# Patient Record
Sex: MALE | Race: WHITE | Employment: UNEMPLOYED | URBAN - METROPOLITAN AREA
[De-identification: names, ages, dates, MRNs, and addresses within clinical notes are randomized per-mention and may not be internally consistent; named-entity substitution may affect disease eponyms.]

---

## 2018-09-14 ENCOUNTER — OFFICE VISIT (OUTPATIENT)
Dept: NEUROLOGY | Age: 43
End: 2018-09-14

## 2018-09-14 VITALS
WEIGHT: 163 LBS | HEART RATE: 79 BPM | DIASTOLIC BLOOD PRESSURE: 80 MMHG | BODY MASS INDEX: 23.34 KG/M2 | HEIGHT: 70 IN | RESPIRATION RATE: 16 BRPM | OXYGEN SATURATION: 95 % | SYSTOLIC BLOOD PRESSURE: 122 MMHG

## 2018-09-14 DIAGNOSIS — G50.0 TRIGEMINAL NEURALGIA OF LEFT SIDE OF FACE: Primary | ICD-10-CM

## 2018-09-14 DIAGNOSIS — C71.9 ANAPLASTIC ASTROCYTOMA OF BRAIN (HCC): ICD-10-CM

## 2018-09-14 DIAGNOSIS — N50.812 PAIN IN LEFT TESTICLE: ICD-10-CM

## 2018-09-14 DIAGNOSIS — M79.652 LEFT THIGH PAIN: ICD-10-CM

## 2018-09-14 RX ORDER — ALPRAZOLAM 0.5 MG/1
TABLET ORAL
Refills: 2 | COMMUNITY
Start: 2018-08-26 | End: 2018-10-26 | Stop reason: SDUPTHER

## 2018-09-14 RX ORDER — GABAPENTIN 300 MG/1
CAPSULE ORAL
COMMUNITY
Start: 2018-09-13 | End: 2018-09-14 | Stop reason: SDUPTHER

## 2018-09-14 RX ORDER — PANTOPRAZOLE SODIUM 40 MG/1
TABLET, DELAYED RELEASE ORAL
Refills: 2 | COMMUNITY
Start: 2018-09-06

## 2018-09-14 RX ORDER — GABAPENTIN 300 MG/1
900 CAPSULE ORAL 3 TIMES DAILY
Qty: 270 CAP | Refills: 2 | Status: SHIPPED | OUTPATIENT
Start: 2018-09-14 | End: 2018-10-03 | Stop reason: SDUPTHER

## 2018-09-14 RX ORDER — SUCRALFATE 1 G/1
TABLET ORAL
Refills: 3 | COMMUNITY
Start: 2018-08-29

## 2018-09-14 RX ORDER — TRAMADOL HYDROCHLORIDE 50 MG/1
50 TABLET ORAL
Qty: 60 TAB | Refills: 0 | Status: SHIPPED | OUTPATIENT
Start: 2018-09-14 | End: 2018-10-26 | Stop reason: CLARIF

## 2018-09-14 RX ORDER — HYDROCODONE BITARTRATE AND ACETAMINOPHEN 5; 325 MG/1; MG/1
TABLET ORAL
Refills: 0 | COMMUNITY
Start: 2018-09-05 | End: 2018-11-26

## 2018-09-14 NOTE — PROGRESS NOTES
NEUROLOGY NEW PATIENT CONSULTATION 
 
REFERRED BY: 
Jamal Moore MD 
 
 
CHIEF COMPLAINT: 
Face and head pain HISTORY OF PRESENT ILLNESS HISTORY PROVIDED BY: 
Patient Family Member: wife Lay Walker is a 37 y.o. male who I am asked to see in consultation for head pain and facial pain. 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 He had an MRI brain in August with stable tumor. The tumor was initially 9-11cm. June of 2016 he had six weeks of chemo and radiation. He then did October 2016 to October 2017 of Temador. He is currently not requiring any further treatment. He doesn't want any more chemo. He currently follows [facility] but is trying to establish at [facility]. He reports he has been getting pain medication from [address] [place] [address]. He has headache and what he describes as tumor pain. However he has developed face pain and sore jaw. He has some issues between his eye and ear. He also has pain that radiates from his side of his head and down. The pain is all on his left side. It began to be searing and pulsing pain down his face. The pain was severe. 10/10 pain. He couldn't see or think. He was also having some left sided blurred vision. [facility] and put on steroids due to possible temporal arterits and he is getting weaned off now. He developed severe left testicular pain about 2 weeks ago. He went to the ED. He had an ultrasound that was negative. He had a CT abdomen that was neg for kidney stones. They saw urology and did a prostate exam and it was told normal. They were told it might be nerve pain because it is radiating to the left thigh. They put him on gabapentin. This was a low dose. He was concerned about taking gabapentin in the past but didn't take it due to concern for SI. He was given enough for 600mg throughout the day. He titrated up to his own meds.  When he had opiates plus gabapentin this helped more. He is taking gabapentin 300mg about 5 times a day plus his opiate medication. No other focal numbness or weakness. [facility] Past Medical History:  
Diagnosis Date  Anxiety  Fatigue  Headache  Joint pain  Leg swelling  Memory loss  Musculoskeletal disorder  Neuropathy [address] Social History Social History  Marital status:  Spouse name: N/A  
 Number of children: N/A  
 Years of education: N/A Social History Main Topics  Smoking status: Former Smoker Quit date: 9/14/2013  Smokeless tobacco: Never Used  Alcohol use No  
 Drug use: Not on file  Sexual activity: Not on file Other Topics Concern  Not on file Social History Narrative  No narrative on file Brent Jordan No significant family history ALLERGIES Allergies Allergen Reactions  Fioricet [Butalbital-Acetaminophen-Caff] Unknown (comments)  Prozac [Fluoxetine] Unknown (comments) CURRENT MEDS Current Outpatient Prescriptions:  
  ALPRAZolam (XANAX) 0.5 mg tablet, TAKE 1 TABLET BY MOUTH TWO TIMES DAILY AS NEEDED FOR ANXIETY, Disp: , Rfl: 2 
  HYDROcodone-acetaminophen (NORCO) 5-325 mg per tablet, TAKE 1 TABLET BY MOUTH EVERY 4 HOURS AS NEEDED FOR PAIN, Disp: , Rfl: 0 
  pantoprazole (PROTONIX) 40 mg tablet, TAKE 1 TABLET BY MOUTH TWO TIMES DAILY, Disp: , Rfl: 2 
  sucralfate (CARAFATE) 1 gram tablet, TAKE 1 TABLET BY MOUTH FOUR TIMES DAILY, Disp: , Rfl: 3 
  gabapentin (NEURONTIN) 300 mg capsule, Take 3 Caps by mouth three (3) times daily. , Disp: 270 Cap, Rfl: 2 
  traMADol (ULTRAM) 50 mg tablet, Take 1 Tab by mouth every six (6) hours as needed for Pain. Max Daily Amount: 200 mg., Disp: 60 Tab, Rfl: 0 REVIEW OF SYSTEMS:  
 
Y  N       Y  N  Y  N   Y  N 
[] [x] AIDS          [] [x] Falls  [x] [x] Memory Loss  [] [x]  Shortness of breath [x] [] Anxiety          [x] [] Fatigue [x] [] Muscle Pain        [] [x]  Skipped beats [] [x] Chest Pain   [x] [] Frequent HA [x] [] Ms Weakness     [] [x]  Snoring 
[] [x] Constipation [] [x]Hearing loss [] [x] Nause/Vomiting  [] [x]  Stomach Pain 
[] [x] Cough          [] [x]Hepatitis [x] [] Neuropathy         [] [x]  Swallowing difficulty 
[] [x] Depression  [] [x]Incontinence [] [x] Poor appetite      [] [x]  Vertigo 
[] [x] Diarrhea       [x] [] Joint Pain [] [x] Rash                   [] [x]  Visual disturbances 
[] [x] Fainting        [x] [] Leg Swelling [] [x] Ringing ears       [] [x]  Weight changes []Unable to obtain  ROS due to  []mental status change  []sedated   []intubated PREVIOUS WORKUP IMAGING: MRI brain from [facility] MRI of brain without and with contrast 
Dated 8/3/2018 9:46 PM 
History: Follow up anaplastic glioma; ?leptomeningeal enhancement. WITH PERFUSION Technique: Multiplanar, multisequence MRI of brain was performed prior to and following IV injection of contrast material 14 cc MultiHance, including a 
dynamic MR susceptibility contrast perfusion sequence Comparison: 5/6/2018 FINDINGS: Again noted is a T2 hyperintense, infiltrative lesion centered in the medial aspect of the left temporal lobe. There is no associated enhancement. The perfusion images demonstrate no increased rCBV. This is similar to prior study. Confluent left periventricular FLAIR hyperintensity is also similar to prior study which represent posttreatment changes. There is no evidence of acute infarction or other acute process. No abnormal postcontrast enhancement. The major intracranial vascular flow-voids are 
preserved. Ventricle and basal cisterns are clear. No midline shift, transtentorial or tonsillar herniation. IMPRESSION: 
No evidence of interval tumor progression or other significant interval change. Nonenhancing T2 hyperintense mass lesion in the left temporal lobe without 
increased perfusion. 10/18/17 Neuro oncology visit note: 
Neuro-oncology OP f/u visit note: Basic Information Source of history:  
Referral source: Dr. Fortino Giles 
 
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 History of Present Illness 40 yo white male, right handed, with h/o mononucleosis, presents with sore throat, dysphagia and incidental finding of L temporal lesion. Pt reports that before this ED visit yesterday, he came to [address] [place] [address] ED on 3/9/16 with a L side headache, word finding difficutly, confusion. He was found hypokelemic, was treated as migraine. His headache was resolved later, as well as ? expressive aphasia. But he reports that since he was diagnosed mononucleosis since 10/2015, he has been easily tired, low energetic, memory declining, as well as some vision declining. He denies obvious seizue episodes. He can recall even the aphasia episode well. Merrilladamaris Rendon he had worsening sore throat with dysphagia, so he was brought to [address] [place] [address] ED again. CT neck soft tissue had incidental finding of L temporal large hypodensity lesion. 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 He is on antiviral treatment. Given the earlier sign of L uncal herniation on image study, LP/CSF study is on hold, as well as steroid d/t concern of CNS lymphoma. When I saw him today, he denies headache, hemiparesis/hemiparesthesia, aphasia, seizure spells, fever, neck stiffness. Pt had a surgical biopsy on 6/6/2016. Pathology revealed WHO Grade 3 Astrocytoma. Completed treatment: 
concurrent TMZ/RT: 7/25/16-8/31/16 Current treatment: 
adjuvant TMZ chemotherapy (started on 10/3/2016),  
cycle 1: 10/3-10/7/16 cycle 2: 10/31-11/4/16 cycle 3: 11/28-12/2/16 cycle 4: 12/26/16, 1/3-1/6/17 
cycle 5: 2/6/17-2/10/17 cycle 6: 3/6/17-3/10/17 cycle 7: 4/3/17-4/7/17 cycle 8: 5/1/17-5/5/17 cycle 9: 6/5/17-6/9/17 
cycle 10: 7/30/17-8/3/17 cycle 11: 8/27/17-8/311/17 cycle 12: 9/26/2017-9/30/2017 Interval history: 
 
June 22, 2016: Pt was seen as an IP consult on 6/2/2016. He had a surgical biopsy done on 6/6/2016. Pathology revealed WHO grade 3 astrocytoma as above. He tolerated surgery well, was d/c home on 6/8. He reports some headache, still on decadron 8mg bid. He also reports that his short term memory, expressive aphasia are improved. September 28, 2016: 
Pt is here for f/u post radiation therapy concurrent with TMZ chemotherapy. Pt reports that he feels well for the past five days, but had fatigue, n/v, during the concurrent RT/TMZ. He also had headache, for which he is still on decadron 6 mg daily. Pt reports that ativan helped to control his n/v during the above RT/TMZ treatment. He denies hemiparesis, seizure spell. He was also found gastritis with upper abd pain, is under investigation per GI. He reports intermittent fullness of b/l ear, was seen by PCP with unremarkable finding. Oct 26, 2016: 
Pt reports that he tolerated the 1st cycle of TMZ relatively well, except he still took ativan and compazine alternatively, with zofran for nausea. He feels that he is more energetic now. He does feel that his short term memory is not as good as it used to be, but also admits that he does have stress and anxiety. He denies HA, sz, hemiparesis. He also takes American Ginseng 2000mg daily for fatigue and feels that it is helpful. He is currently on decadron 2 mg daily. December 9, 2016: 
Pt reports that he had bad nausea and vomiting during the past cycle of TMZ chemotherapy, but admits that he has not taken antiemesis for 1-2 days. He also reports a few days ago, he has an acute LLE transient numbness. Otherwise he denies headache, seizure. He has weaned off the steroid. January 13, 2017: 
Pt reports that he had a norovirus infection during holiday, which caused him nausea, vomiting, diarrhea.  He started TMZ cycle 4 on 12/26, but had to suspend it, and resumed it on 1/3 and finished it on 1/6/17. He also c/o malaise, headache, SOB during and about one week after chemotherapy. He was in ED with a neagative CTA chest for PE w/u. He endorses that he is improving, denies any hemiparesis, sz spell. February 8, 2017: 
Pt reports that he just recovered from his cold. He started this cycle of TMZ chemotherapy on 2/6, tolerated well, denies n/v, hemiparesis, seizure. March 8, 2017: 
Pt had a repeat MRI on 3/6, which is an closer monitoring the change that was noticed on last image study. He also had MGMT checked, with a result showing methylated. He c/o continous headache on L side of head, described as sharp, hammer hitting like, constant if not using narcotics, with photophobia. He denies morning wake up pattern, n/v, hemiparesis. His sleep is good, about 7 hr per night. He denies previous hx of chronic headache. April 27, 2017: 
Pt reports that his headache, stress during the chemotherapy is better. He takes hydrocodone and aniemesis. He denies any sz events. June 21, 2017: Pt was last seen by Ms. Claudia Godfrey NP on 5/25/2017. Pt's wife reports that he has been more emotional, abrupt crying frequently. He denies worse headache, n/v, hemiparesis, seizure events or insomnia. But he endorses poor appetite, weight loss. He does have some anxity. Pt refuses to go to see mental health for evaluation. 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 Pt cries a few times during this visit and is anxious to leave. August 17, 2017: He is doing well today and states he has done well over the past month. He tolerated his chemo as well as possible. Pt denies chewing or swallowing difficulties. He denies vomiting, diarrhea, constipation. He has constant nausea. Denies CP or SOB, denies incontinence, denies changes in his extremity strength or function. He denies blackouts, seizures or falls.  He is taking Vicodin for headache and has gotten into taking it everyday. I explained to him he should not take it everyday unless he has a severe headache. He states some days he will take 8 pills a day. I explained to him we have got to cut back on this. I have limited his pills this RX and have spoken with Dr. Eleuterio Jolly about the treatment of HA. October 18, 2017: Pt was last seen by Marvin Lopez NP on 9/14. He completed his 12th cycle of adjuvant TMZ chemotherapy after that visit. He reports no more than usual side effect, e.g. fatigue, mild nausea. But he obvious lost some weight continuously. He also reports that he still takes 4-5 pills of Vicodin almost daily for his headache and ? anxiety. He admits that he did not follow what I gave him the dose a few months ago which was no mor than three pills a day (90 tab a month of script). When he had to cut down to 2 pills a day which was prescribed by NP, he reports more diarrhea. He knows that he developed some dependence to narcotics and would like to slowly cut it down. He denies seizure events, hemiparesis/hemiparesthesia. Histories Past Medical History:  
Problem List (Active Medical Only) This information was current as of 10/18/17 @ 15:09:00. Active: 
-Abdominal pain 
-Astrocytoma of brain 
-Shoulder pain 
-SUPERIOR GLENOID LABRUM LESION Social History This information as of 15:09 on 10/18/17. 
-Tobacco Assessment Former smoker, Cigarettes Former smoker, Cigarettes Use: 10 or more cigarettes (1/2 pack or more)/day in last 30 days. Cigarettes Former smoker 
-Alcohol Assessment Denies Alcohol Use Comment: States he only drinks water for fluid intake. 
-Substance Abuse Assessment Denies Substance Use 
-Nutrition/Health Assessment Type of diet: Pt. avoiding sugary foods. Regular 
-Exercise Assessment Exercise frequency: Daily. Comment: Likes to play outside with the kids 
-Home/Environment Assessment Lives with rents a house with his stay at home wife and 2 young children 1/y/o and 3 y/o. Home type: Single 
family house. Home equipment: None. 
-Functional Assessment Relies on others for physical assistance: None. Relies on others for transportation: None. 
-Employment/School Assessment Disability 
-Other Assessment HITS score = 4 Comment: has a large family, several siblings and mom in the area 20 year pack hx of smoking, now stopped. Family History:  
no cancer hx in 1st degree family members. Allergies / Current Meds / Problem List  
Allergies: . Allergic Reactions (Selected) NKA Current medications: . Home Medications This information was current as OF 10/18/17 @ 15:09:00. Prescriptions Documented Meds By Hx: 
-alprazolam (Xanax 0.5 mg oral tablet)(Rx): 0.5 mg, PO, twice daily, as needed, for anxiety [Still taking, as prescribed] -docusate (docusate sodium 100 mg oral capsule)(Rx): 100 mg, PO, every 12 hours [Still taking, as prescribed] 
-gabapentin (gabapentin 100 mg oral capsule)(Rx): Special Instructions: 1 cap qhs x 3 day then 1 cap bid x 3 days, then 1 cap tid [Still taking, as prescribed] 
-ondansetron (Zofran ODT 8 mg oral tablet, disintegrating)(Rx): 8 mg, PO, every 8 hours [Still taking, as prescribed] 
-ondansetron (ondansetron 8 mg oral tablet, disintegrating)(Rx): 8 mg, PO, every 8 hours [Still taking, as prescribed] 
-pantoprazole (Protonix 40 mg oral delayed release tablet)(Rx): 40 mg, PO, twice daily [Still taking, as prescribed] 
-sucralfate (Carafate 1 g oral tablet)(Rx): 1 g, PO, before meals and at bedtime [Still taking, as prescribed] 
-sulindac (sulindac 150 mg oral tablet)(Rx): 150 mg, PO, twice daily, as needed, Headache [Still taking, as prescribed] 
-temozolomide (Temodar 180 mg oral capsule)(Rx): 360 mg, PO, bedtime, TOTAL 380MG DAILYat the same time every daywith a full glass of waterdo not crush or chew [Still taking, as prescribed] Physical Examination VS: T 36.7 C /88 HR 66 RR 20 Sp02 100% Pain 0 Wt 66.2 kg (10/18 14:49) NEUROLOGICAL EXAMINATION: 
Neurologic Exam: 
MS: alert and oriented to person, place, month, and year, following commands. Speech: fluent, no dysarthria. CN: II-XII intact; pupils equal and reactive. Motor: 5/5 throughout, bilaterally. Sensory: intact to LT, pain, temprature, vibration and proprioception. Reflexes 1+ brisk throughout, 1+ patellar bilaterally, downgoing toes. Coordination: FNF, HTS - wnl, MELANY - wnl. Pronator drift: neg Romburg's: neg 
Gait: nnl  
 
 
KPS: 80 Medical Decision Making: 
chart reviewed MRI brain w/wo contrast on 10/18/2017: reviewed My preview is stable tumor site. Final read is pending. Lab: 
Most Recent Labs (Last 30 Days): This information was current as of 10/18/17 @ 15:09:00. CBC (09/26 12:38) Hgb 15.1 HCT 40.3 WBC 3.5 MCV 90.6  Last 24H:Most Recent Labs (Last 30 Days): This information was current as of 10/18/17 @ 15:09:00. CBC (09/26 12:38) Hgb 15.1 HCT 40.3 WBC 3.5 MCV 90.6  Last 24H: 
 
 
Assessment and Plan:  
44 yo white male, right handed, with L temporal anaplastic astrocytoma, s/p surgical biopsy and completion of concurrent TMZ/RT and 12 cycles of adjuvant TMZ chemotherapy, with c/o chronic headache, anxiety and likely narcotic dependence, stable T2 FLAIR on MRI, with unremarkable neuro exam. 
 
-Reviewed images with pt and his wife, it shows stable disease by my preview, though the final read is pending.  
-Since he has completed 12 cycles of adjuvant TMZ chemotherapy, we will stop this treatment. 
-Pt and wife are aware that he has developed some narcotic dependency, they would like to cut it down slowly. I have reviewed his narcotics prescription through [facility] which shows that he only receives scripts of narcotics from our service.  His urine test was + for opiate in the past. So I prescribed 5 tab daily dose for him this month, and told them that we will cut it down next month. I also prescribed xanax 0.5 mg bid prn for his anxiety. 
-Encourage daily exercises for 30 min, 5 days a week. -Will repeat brain MRI in 8 weeks. RTC in 2 month. cc:  
Dr. Dillon Dubon (rad-onc) Dr. Arabella Norwood (nsgy) PCP:MELISSA MAZA MD 
 
 
================================================================== 
PERFORM Performed By: Clarke Lin [id number] is COMPLETED MODIFY Performed By: Clarke Lin [id number] is COMPLETED 
SIGN Performed By: Clarke Lin [id number] is COMPLETED 
VERIFY Performed By: Clarke Lin [id number] is COMPLETED Author: Chrissie Herbert Neuro-Oncology OP Estab Visit I personally reviewed the patient's records from [facility] oncology and ENT. The above note is his most recent oncology note. In review of [facility] records patient has completed treatment and they were weaning him off on narcotics that were started during treatment for headache. Apparently the wife and patient agreed to this regimen. In the  that I reviewed today his last narcotic prescription from [facility] was May 7. On 9/2 and 9/5 he received narcotics from the emergency room and his urologist. 
 
Yoav Dodson No results found for this or any previous visit. PHYSICAL EXAM 
Visit Vitals  /80  Pulse 79  Resp 16  
 Ht 5' 10\" (1.778 m)  Wt 73.9 kg (163 lb)  SpO2 95%  BMI 23.39 kg/m2 General:  Alert, cooperative, no distress. Head:  Normocephalic, without obvious abnormality, atraumatic. Eyes:  Conjunctivae/corneas clear. Pupils equal, round, reactive to light. Extraocular movements intact, VFF, NO papilledema Lungs: 
Heart:   Non labored breathing Regular rate and rhythm, no carotid bruits Abdomen:   Soft, non-distended Extremities: Extremities normal, atraumatic, no cyanosis or edema. Pulses: 2+ and symmetric all extremities. Skin: Skin color, texture, turgor normal. No rashes or lesions.   
Neurologic:  Gen: Attention normal 
 Language: naming, repetition, fluency normal 
           Memory: intact recent and remote memory Cranial Nerves: 
I: smell Not tested II: visual fields Full to confrontation II: pupils Equal, round, reactive to light II: optic disc No papilledema III,VII: ptosis none III,IV,VI: extraocular muscles  Full ROM V: mastication normal  
V: facial light touch sensation  normal  
VII: facial muscle function   symmetric VIII: hearing symmetric IX: soft palate elevation  normal  
XI: trapezius strength  5/5 XI: sternocleidomastoid strength 5/5 XI: neck flexion strength  5/5 XII: tongue  midline Motor: normal bulk and tone, no tremor Strength: 5/5 all four extremities Sensory: intact to LT, PP, vibration, and temperature Coordination: FTN intact, Rhomberg negative Gait: Antalgic gait due to testicular pain Reflexes: 2+ throughout IMPRESSION Kaelyn Xiao is a 37 y.o. male who presents for evaluation of facial pain, headache, and testicular pain. 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 Patient today presents with facial pain and testicular pain. He reports some radiation of pain down his left thigh as well. Will do an MRI of the lumbar spine and EMG/NCS to evaluate for any nerve issue. I suspect patient's facial pain could be trigeminal neuralgia. Discussed the patient neuropathic pain medication is the best for this. Schedule given to increase his gabapentin. One-time prescription for tramadol was given. Discussed with patient that if further medication is needed he will need to be a pain management. RECOMMENDATIONS Visit Diagnoses Trigeminal neuralgia of left side of face    -  Primary Relevant Medications  
 gabapentin (NEURONTIN) 300 mg capsulepatient should increase to 900 mg 3 times a day   
 traMADol (ULTRAM) 50 mg tablet 1 time prescription given.   Discussed with patient that no further narcotic pain medication will be given and that he will need pain management for further meds Left thigh pain/pain in left testicle Relevant Medications  
 traMADol (ULTRAM) 50 mg tablet Other Relevant Orders MRI LUMB SPINE W WO CONT  
 EMG LIMITED Anaplastic astrocytoma of brain ([facility] [address] Relevant Medications Recent MRI with stable tumor Encourage patient to establish with [facility] brain tumor clinic or [facility] FU after testing Tyrell Zapien MD 
 
CC: Linn Rodriguez MD/ Fax: [phone] Medications and side effects discussed with patient in detail. With any new medications prescribed, patient was given instructions on administration and side effects. Written medication information was provided to the patient as well. This note was created using voice recognition software. Despite editing, there may be syntax errors. This note will not be viewable in [address]

## 2018-09-14 NOTE — LETTER
Dear Edward Matthew MD, Thank you for allowing me to see your patient, Collin Pinzon for a neurological consultation. Please see my impression and recommendations as outlined in my note. Sincerely, Inés Covarrubias MD 
[facility] at [address] [place] [address] BY: 
Edward Matthew MD 
 
 
CHIEF COMPLAINT: 
Face and head pain HISTORY OF PRESENT ILLNESS HISTORY PROVIDED BY: 
Patient Family Member: wife Collin Pinzon is a 37 y.o. male who I am asked to see in consultation for head pain and facial pain. 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 He had an MRI brain in August with stable tumor. The tumor was initially 9-11cm. June of 2016 he had six weeks of chemo and radiation. He then did October 2016 to October 2017 of Temador. He is currently not requiring any further treatment. He doesn't want any more chemo. [facility] but is trying to establish at [facility]. He reports he has been getting pain medication from [facility]. He has headache and what he describes as tumor pain. However he has developed face pain and sore jaw. He has some issues between his eye and ear. He also has pain that radiates from his side of his head and down. The pain is all on his left side. It began to be searing and pulsing pain down his face. The pain was severe. 10/10 pain. He couldn't see or think. He was also having some left sided blurred vision. [facility] and [facility] on steroids due to possible temporal arterits and he is getting weaned off now. He developed severe left testicular pain about 2 weeks ago. He went to the ED. He had an ultrasound that was negative. He had a CT abdomen that was neg for kidney stones.  They saw urology and did a prostate exam and it was told normal. They were told it might be nerve pain because it is radiating to the left thigh. They put him on gabapentin. This was a low dose. He was concerned about taking gabapentin in the past but didn't take it due to concern for SI. He was given enough for 600mg throughout the day. He titrated up to his own meds. When he had opiates plus gabapentin this helped more. He is taking gabapentin 300mg about 5 times a day plus his opiate medication. No other focal numbness or weakness. [facility] Past Medical History:  
Diagnosis Date  Anxiety  Fatigue  Headache  Joint pain  Leg swelling  Memory loss  Musculoskeletal disorder  Neuropathy [address] Social History Social History  Marital status:  Spouse name: N/A  
 Number of children: N/A  
 Years of education: N/A Social History Main Topics  Smoking status: Former Smoker Quit date: 9/14/2013  Smokeless tobacco: Never Used  Alcohol use No  
 Drug use: Not on file  Sexual activity: Not on file Other Topics Concern  Not on file Social History Narrative  No narrative on file [facility] No significant family history ALLERGIES Allergies Allergen Reactions  Fioricet [Butalbital-Acetaminophen-Caff] Unknown (comments)  Prozac [Fluoxetine] Unknown (comments) CURRENT MEDS Current Outpatient Prescriptions:  
  ALPRAZolam (XANAX) 0.5 mg tablet, TAKE 1 TABLET BY MOUTH TWO TIMES DAILY AS NEEDED FOR ANXIETY, Disp: , Rfl: 2 
  HYDROcodone-acetaminophen (NORCO) 5-325 mg per tablet, TAKE 1 TABLET BY MOUTH EVERY 4 HOURS AS NEEDED FOR PAIN, Disp: , Rfl: 0 
  pantoprazole (PROTONIX) 40 mg tablet, TAKE 1 TABLET BY MOUTH TWO TIMES DAILY, Disp: , Rfl: 2 
  sucralfate (CARAFATE) 1 gram tablet, TAKE 1 TABLET BY MOUTH FOUR TIMES DAILY, Disp: , Rfl: 3 
  gabapentin (NEURONTIN) 300 mg capsule, Take 3 Caps by mouth three (3) times daily. , Disp: 270 Cap, Rfl: 2 
  traMADol (ULTRAM) 50 mg tablet, Take 1 Tab by mouth every six (6) hours as needed for Pain. Max Daily Amount: 200 mg., Disp: 60 Tab, Rfl: 0 REVIEW OF SYSTEMS:  
 
Y  N       Y  N  Y  N   Y  N 
[] [x] AIDS          [] [x] Falls  [x] [x] Memory Loss  [] [x]  Shortness of breath [x] [] Anxiety          [x] [] Fatigue [x] [] Muscle Pain        [] [x]  Skipped beats 
[] [x] Chest Pain   [x] [] Frequent HA [x] [] Ms Weakness     [] [x]  Snoring 
[] [x] Constipation [] [x]Hearing loss [] [x] Nause/Vomiting  [] [x]  Stomach Pain 
[] [x] Cough          [] [x]Hepatitis [x] [] Neuropathy         [] [x]  Swallowing difficulty 
[] [x] Depression  [] [x]Incontinence [] [x] Poor appetite      [] [x]  Vertigo 
[] [x] Diarrhea       [x] [] Joint Pain [] [x] Rash                   [] [x]  Visual disturbances 
[] [x] Fainting        [x] [] Leg Swelling [] [x] Ringing ears       [] [x]  Weight changes []Unable to obtain  ROS due to  []mental status change  []sedated   []intubated PREVIOUS WORKUP IMAGING: MRI brain from [facility] MRI of brain without and with contrast 
Dated 8/3/2018 9:46 PM 
History: Follow up anaplastic glioma; ?leptomeningeal enhancement. WITH PERFUSION Technique: Multiplanar, multisequence MRI of brain was performed prior to and following IV injection of contrast material 14 cc MultiHance, including a 
dynamic MR susceptibility contrast perfusion sequence Comparison: 5/6/2018 FINDINGS: Again noted is a T2 hyperintense, infiltrative lesion centered in the medial aspect of the left temporal lobe. There is no associated enhancement. The perfusion images demonstrate no increased rCBV. This is similar to prior study. Confluent left periventricular FLAIR hyperintensity is also similar to prior study which represent posttreatment changes. There is no evidence of acute infarction or other acute process. No abnormal postcontrast enhancement. The major intracranial vascular flow-voids are 
preserved. Ventricle and basal cisterns are clear.  No midline shift, transtentorial or tonsillar herniation. IMPRESSION: 
No evidence of interval tumor progression or other significant interval change. Nonenhancing T2 hyperintense mass lesion in the left temporal lobe without 
increased perfusion. 10/18/17 Neuro oncology visit note: 
Neuro-oncology OP f/u visit note: 
 
Basic Information Source of history:  
Referral source: Dr. Camila Ramirez 
 
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 History of Present Illness 40 yo white male, right handed, with h/o mononucleosis, presents with sore throat, dysphagia and incidental finding of L temporal lesion. Pt reports that before this ED visit yesterday, he came to [address] [place] [address] ED on 3/9/16 with a L side headache, word finding difficutly, confusion. He was found hypokelemic, was treated as migraine. His headache was resolved later, as well as ? expressive aphasia. But he reports that since he was diagnosed mononucleosis since 10/2015, he has been easily tired, low energetic, memory declining, as well as some vision declining. He denies obvious seizue episodes. He can recall even the aphasia episode well. Jackson Danelle he had worsening sore throat with dysphagia, so he was brought to [address] [place] [address] ED again. CT neck soft tissue had incidental finding of L temporal large hypodensity lesion. 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 He is on antiviral treatment. Given the earlier sign of L uncal herniation on image study, LP/CSF study is on hold, as well as steroid d/t concern of CNS lymphoma. When I saw him today, he denies headache, hemiparesis/hemiparesthesia, aphasia, seizure spells, fever, neck stiffness. Pt had a surgical biopsy on 6/6/2016. Pathology revealed WHO Grade 3 Astrocytoma. Completed treatment: 
concurrent TMZ/RT: 7/25/16-8/31/16 Current treatment: 
adjuvant TMZ chemotherapy (started on 10/3/2016),  
 cycle 1: 10/3-10/7/16 cycle 2: 10/31-11/4/16 cycle 3: 11/28-12/2/16 cycle 4: 12/26/16, 1/3-1/6/17 
cycle 5: 2/6/17-2/10/17 cycle 6: 3/6/17-3/10/17 cycle 7: 4/3/17-4/7/17 cycle 8: 5/1/17-5/5/17 cycle 9: 6/5/17-6/9/17 
cycle 10: 7/30/17-8/3/17 cycle 11: 8/27/17-8/311/17 cycle 12: 9/26/2017-9/30/2017 Interval history: 
 
June 22, 2016: Pt was seen as an IP consult on 6/2/2016. He had a surgical biopsy done on 6/6/2016. Pathology revealed WHO grade 3 astrocytoma as above. He tolerated surgery well, was d/c home on 6/8. He reports some headache, still on decadron 8mg bid. He also reports that his short term memory, expressive aphasia are improved. September 28, 2016: 
Pt is here for f/u post radiation therapy concurrent with TMZ chemotherapy. Pt reports that he feels well for the past five days, but had fatigue, n/v, during the concurrent RT/TMZ. He also had headache, for which he is still on decadron 6 mg daily. Pt reports that ativan helped to control his n/v during the above RT/TMZ treatment. He denies hemiparesis, seizure spell. He was also found gastritis with upper abd pain, is under investigation per GI. He reports intermittent fullness of b/l ear, was seen by PCP with unremarkable finding. Oct 26, 2016: 
Pt reports that he tolerated the 1st cycle of TMZ relatively well, except he still took ativan and compazine alternatively, with zofran for nausea. He feels that he is more energetic now. He does feel that his short term memory is not as good as it used to be, but also admits that he does have stress and anxiety. He denies HA, sz, hemiparesis. He also takes American Ginseng 2000mg daily for fatigue and feels that it is helpful. He is currently on decadron 2 mg daily.  
 
December 9, 2016: 
Pt reports that he had bad nausea and vomiting during the past cycle of TMZ chemotherapy, but admits that he has not taken antiemesis for 1-2 days. He also reports a few days ago, he has an acute LLE transient numbness. Otherwise he denies headache, seizure. He has weaned off the steroid. January 13, 2017: 
Pt reports that he had a norovirus infection during holiday, which caused him nausea, vomiting, diarrhea. He started TMZ cycle 4 on 12/26, but had to suspend it, and resumed it on 1/3 and finished it on 1/6/17. He also c/o malaise, headache, SOB during and about one week after chemotherapy. He was in ED with a neagative CTA chest for PE w/u. He endorses that he is improving, denies any hemiparesis, sz spell. February 8, 2017: 
Pt reports that he just recovered from his cold. He started this cycle of TMZ chemotherapy on 2/6, tolerated well, denies n/v, hemiparesis, seizure. March 8, 2017: 
Pt had a repeat MRI on 3/6, which is an closer monitoring the change that was noticed on last image study. He also had MGMT checked, with a result showing methylated. He c/o continous headache on L side of head, described as sharp, hammer hitting like, constant if not using narcotics, with photophobia. He denies morning wake up pattern, n/v, hemiparesis. His sleep is good, about 7 hr per night. He denies previous hx of chronic headache. April 27, 2017: 
Pt reports that his headache, stress during the chemotherapy is better. He takes hydrocodone and aniemesis. He denies any sz events. June 21, 2017: Pt was last seen by Ms. Roberto Casas NP on 5/25/2017. Pt's wife reports that he has been more emotional, abrupt crying frequently. He denies worse headache, n/v, hemiparesis, seizure events or insomnia. But he endorses poor appetite, weight loss. He does have some anxity. Pt refuses to go to see mental health for evaluation. 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 Pt cries a few times during this visit and is anxious to leave.  
 
 
August 17, 2017: He is doing well today and states he has done well over the past month. He tolerated his chemo as well as possible. Pt denies chewing or swallowing difficulties. He denies vomiting, diarrhea, constipation. He has constant nausea. Denies CP or SOB, denies incontinence, denies changes in his extremity strength or function. He denies blackouts, seizures or falls. He is taking Vicodin for headache and has gotten into taking it everyday. I explained to him he should not take it everyday unless he has a severe headache. He states some days he will take 8 pills a day. I explained to him we have got to cut back on this. I have limited his pills this RX and have spoken with Dr. Juleen Schilder about the treatment of HA. October 18, 2017: Pt was last seen by Koby Freeman NP on 9/14. He completed his 12th cycle of adjuvant TMZ chemotherapy after that visit. He reports no more than usual side effect, e.g. fatigue, mild nausea. But he obvious lost some weight continuously. He also reports that he still takes 4-5 pills of Vicodin almost daily for his headache and ? anxiety. He admits that he did not follow what I gave him the dose a few months ago which was no mor than three pills a day (90 tab a month of script). When he had to cut down to 2 pills a day which was prescribed by NP, he reports more diarrhea. He knows that he developed some dependence to narcotics and would like to slowly cut it down. He denies seizure events, hemiparesis/hemiparesthesia. Histories Past Medical History:  
Problem List (Active Medical Only) This information was current as of 10/18/17 @ 15:09:00. Active: 
-Abdominal pain 
-Astrocytoma of brain 
-Shoulder pain 
-SUPERIOR GLENOID LABRUM LESION Social History This information as of 15:09 on 10/18/17. 
-Tobacco Assessment Former smoker, Cigarettes Former smoker, Cigarettes Use: 10 or more cigarettes (1/2 pack or more)/day in last 30 days. Cigarettes Former smoker 
-Alcohol Assessment Denies Alcohol Use 
 Comment: States he only drinks water for fluid intake. 
-Substance Abuse Assessment Denies Substance Use 
-Nutrition/Health Assessment Type of diet: Pt. avoiding sugary foods. Regular 
-Exercise Assessment Exercise frequency: Daily. Comment: Likes to play outside with the kids 
-Home/Environment Assessment Lives with rents a house with his stay at home wife and 2 young children 1/y/o and 1 y/o. Home type: Single 
family house. Home equipment: None. 
-Functional Assessment Relies on others for physical assistance: None. Relies on others for transportation: None. 
-Employment/School Assessment Disability 
-Other Assessment HITS score = 4 Comment: has a large family, several siblings and mom in the area 20 year pack hx of smoking, now stopped. Family History:  
no cancer hx in 1st degree family members. Allergies / Current Meds / Problem List  
Allergies: . Allergic Reactions (Selected) NKA Current medications: . Home Medications This information was current as OF 10/18/17 @ 15:09:00. Prescriptions Documented Meds By Hx: 
-alprazolam (Xanax 0.5 mg oral tablet)(Rx): 0.5 mg, PO, twice daily, as needed, for anxiety [Still taking, as prescribed] -docusate (docusate sodium 100 mg oral capsule)(Rx): 100 mg, PO, every 12 hours [Still taking, as prescribed] 
-gabapentin (gabapentin 100 mg oral capsule)(Rx): Special Instructions: 1 cap qhs x 3 day then 1 cap bid x 3 days, then 1 cap tid [Still taking, as prescribed] 
-ondansetron (Zofran ODT 8 mg oral tablet, disintegrating)(Rx): 8 mg, PO, every 8 hours [Still taking, as prescribed] 
-ondansetron (ondansetron 8 mg oral tablet, disintegrating)(Rx): 8 mg, PO, every 8 hours [Still taking, as prescribed] 
-pantoprazole (Protonix 40 mg oral delayed release tablet)(Rx): 40 mg, PO, twice daily [Still taking, as prescribed] 
-sucralfate (Carafate 1 g oral tablet)(Rx): 1 g, PO, before meals and at bedtime [Still taking, as prescribed] -sulindac (sulindac 150 mg oral tablet)(Rx): 150 mg, PO, twice daily, as needed, Headache [Still taking, as prescribed] 
-temozolomide (Temodar 180 mg oral capsule)(Rx): 360 mg, PO, bedtime, TOTAL 380MG DAILYat the same time every daywith a full glass of waterdo not crush or chew [Still taking, as prescribed] Physical Examination VS: T 36.7 C /88 HR 66 RR 20 Sp02 100% Pain 0 Wt 66.2 kg (10/18 14:49) NEUROLOGICAL EXAMINATION: 
Neurologic Exam: 
MS: alert and oriented to person, place, month, and year, following commands. Speech: fluent, no dysarthria. CN: II-XII intact; pupils equal and reactive. Motor: 5/5 throughout, bilaterally. Sensory: intact to LT, pain, temprature, vibration and proprioception. Reflexes 1+ brisk throughout, 1+ patellar bilaterally, downgoing toes. Coordination: FNF, HTS - wnl, MELANY - wnl. Pronator drift: neg Romburg's: neg 
Gait: nnl  
 
 
KPS: 80 Medical Decision Making: 
chart reviewed MRI brain w/wo contrast on 10/18/2017: reviewed My preview is stable tumor site. Final read is pending. Lab: 
Most Recent Labs (Last 30 Days): This information was current as of 10/18/17 @ 15:09:00. CBC (09/26 12:38) Hgb 15.1 HCT 40.3 WBC 3.5 MCV 90.6  Last 24H:Most Recent Labs (Last 30 Days): This information was current as of 10/18/17 @ 15:09:00. CBC (09/26 12:38) Hgb 15.1 HCT 40.3 WBC 3.5 MCV 90.6  Last 24H: 
 
 
Assessment and Plan:  
44 yo white male, right handed, with L temporal anaplastic astrocytoma, s/p surgical biopsy and completion of concurrent TMZ/RT and 12 cycles of adjuvant TMZ chemotherapy, with c/o chronic headache, anxiety and likely narcotic dependence, stable T2 FLAIR on MRI, with unremarkable neuro exam. 
 
-Reviewed images with pt and his wife, it shows stable disease by my preview, though the final read is pending.  
-Since he has completed 12 cycles of adjuvant TMZ chemotherapy, we will stop this treatment. -Pt and wife are aware that he has developed some narcotic dependency, they would like to cut it down slowly. I have reviewed his narcotics prescription through [facility] which shows that he only receives scripts of narcotics from our service. His urine test was + for opiate in the past. So I prescribed 5 tab daily dose for him this month, and told them that we will cut it down next month. I also prescribed xanax 0.5 mg bid prn for his anxiety. 
-Encourage daily exercises for 30 min, 5 days a week. -Will repeat brain MRI in 8 weeks. RTC in 2 month. cc:  
Dr. Yessi Sesay (rad-onc) Dr. Jennifer Paul (nsgy) PCP:MELISSA MAZA MD 
 
 
================================================================== 
PERFORM Performed By: Samantha Yung [id number] is COMPLETED MODIFY Performed By: Samantha Yung [id number] is COMPLETED 
SIGN Performed By: Samantha Yung [id number] is COMPLETED 
VERIFY Performed By: Samantha Yung [id number] is COMPLETED Author: [facility] OP Estab Visit I personally reviewed the patient's records from [facility] oncology and ENT. The above note is his most recent oncology note. In review of [facility] records patient has completed treatment and they were weaning him off on narcotics that were started during treatment for headache. Apparently the wife and patient agreed to this regimen. In the  that I reviewed today his last narcotic prescription from [facility] was May 7. On 9/2 and 9/5 he received narcotics from the emergency room and his urologist. 
 
Joe Lubin No results found for this or any previous visit. PHYSICAL EXAM 
Visit Vitals  /80  Pulse 79  Resp 16  
 Ht 5' 10\" (1.778 m)  Wt 73.9 kg (163 lb)  SpO2 95%  BMI 23.39 kg/m2 General:  Alert, cooperative, no distress. Head:  Normocephalic, without obvious abnormality, atraumatic. Eyes:  Conjunctivae/corneas clear. Pupils equal, round, reactive to light. Extraocular movements intact, VFF, NO papilledema Lungs: 
Heart:   Non labored breathing Regular rate and rhythm, no carotid bruits Abdomen:   Soft, non-distended Extremities: Extremities normal, atraumatic, no cyanosis or edema. Pulses: 2+ and symmetric all extremities. Skin: Skin color, texture, turgor normal. No rashes or lesions. Neurologic:  Gen: Attention normal 
           Language: naming, repetition, fluency normal 
           Memory: intact recent and remote memory Cranial Nerves: 
I: smell Not tested II: visual fields Full to confrontation II: pupils Equal, round, reactive to light II: optic disc No papilledema III,VII: ptosis none III,IV,VI: extraocular muscles  Full ROM V: mastication normal  
V: facial light touch sensation  normal  
VII: facial muscle function   symmetric VIII: hearing symmetric IX: soft palate elevation  normal  
XI: trapezius strength  5/5 XI: sternocleidomastoid strength 5/5 XI: neck flexion strength  5/5 XII: tongue  midline Motor: normal bulk and tone, no tremor Strength: 5/5 all four extremities Sensory: intact to LT, PP, vibration, and temperature Coordination: FTN intact, Rhomberg negative Gait: Antalgic gait due to testicular pain Reflexes: 2+ throughout IMPRESSION Carmen Quintero is a 37 y.o. male who presents for evaluation of facial pain, headache, and testicular pain. 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 Patient today presents with facial pain and testicular pain. He reports some radiation of pain down his left thigh as well. Will do an MRI of the lumbar spine and EMG/NCS to evaluate for any nerve issue. I suspect patient's facial pain could be trigeminal neuralgia. Discussed the patient neuropathic pain medication is the best for this. Schedule given to increase his gabapentin. One-time prescription for tramadol was given.   Discussed with patient that if further medication is needed he will need to be a pain management. RECOMMENDATIONS Visit Diagnoses Trigeminal neuralgia of left side of face    -  Primary Relevant Medications  
 gabapentin (NEURONTIN) 300 mg capsulepatient should increase to 900 mg 3 times a day   
 traMADol (ULTRAM) 50 mg tablet 1 time prescription given. Discussed with patient that no further narcotic pain medication will be given and that he will need pain management for further meds Left thigh pain/pain in left testicle Relevant Medications  
 traMADol (ULTRAM) 50 mg tablet Other Relevant Orders MRI LUMB SPINE W WO CONT  
 EMG LIMITED Anaplastic astrocytoma of brain ([address] Relevant Medications Recent MRI with stable tumor Encourage patient to establish with [facility] brain tumor clinic or [facility] FU after testing Akosua Boyd MD 
 
CC: Thiago Snell MD/ Fax: [phone] Medications and side effects discussed with patient in detail. With any new medications prescribed, patient was given instructions on administration and side effects. Written medication information was provided to the patient as well. This note was created using voice recognition software. Despite editing, there may be syntax errors. This note will not be viewable in [address] Chief Complaint Patient presents with  
 Head Pain  Other  
  brain tumor/ Trigeminal  neuralgia 1. Have you been to the ER, urgent care clinic since your last visit? Hospitalized since your last visit? No 
 
2. Have you seen or consulted any other health care providers outside of the [address] [place] [address] since your last visit? Include any pap smears or colon screening. No  
 
 
Visit Vitals  /80  Pulse 79  Resp 16  
 Ht 5' 10\" (1.778 m)  Wt 73.9 kg (163 lb)  SpO2 95%  BMI 23.39 kg/m2

## 2018-09-14 NOTE — PROGRESS NOTES
Chief Complaint Patient presents with  
 Head Pain  Other  
  brain tumor/ Trigeminal  neuralgia 1. Have you been to the ER, urgent care clinic since your last visit? Hospitalized since your last visit? No 
 
2. Have you seen or consulted any other health care providers outside of the [address] [place] [address] since your last visit? Include any pap smears or colon screening. No  
 
 
Visit Vitals  /80  Pulse 79  Resp 16  
 Ht 5' 10\" (1.778 m)  Wt 73.9 kg (163 lb)  SpO2 95%  BMI 23.39 kg/m2

## 2018-09-14 NOTE — MR AVS SNAPSHOT
[facility] [address] [facility] [address] [phone] Patient: Collin Pinzon MRN: [id number] Visit Information Date & Time Provider Department Dept. Phone Encounter #  
 2018  1:00 PM Alma Kirby MD [facility] Neurology [facility] [phone] [id number] Upcoming Health Maintenance Date Due DTaP/Tdap/Td series (1 - Tdap) 1996 Influenza Age 5 to Adult 2018 Allergies as of 2018  Never Reviewed Severity Noted Reaction Type Reactions Fioricet [Butalbital-acetaminophen-caff]  2018    Unknown (comments) Prozac [Fluoxetine]  2018    Unknown (comments) Current Immunizations  Never Reviewed No immunizations on file. Not reviewed this visit You Were Diagnosed With   
  
 Codes Comments Trigeminal neuralgia of left side of face    -  Primary ICD-10-CM: G50.0 ICD-9-CM: 350.1 Left thigh pain     ICD-10-CM: S62.660 ICD-9-CM: 729.5 Pain in left testicle     ICD-10-CM: P37.672 ICD-9-CM: 608.9 Anaplastic astrocytoma of brain ([facility] [address]     ICD-10-CM: C71.9 ICD-9-CM: 191.9 Vitals BP Pulse Resp Height(growth percentile) Weight(growth percentile) SpO2  
 122/80 79 16 5' 10\" (1.778 m) 163 lb (73.9 kg) 95% BMI Smoking Status 23.39 kg/m2 Former Smoker Vitals History BMI and BSA Data Body Mass Index Body Surface Area  
 23.39 kg/m 2 1.91 m 2 Preferred Pharmacy [facility] Name Phone [id number] [address], [address] [facility] [phone] Your Updated Medication List  
  
   
This list is accurate as of 18  2:02 PM.  Always use your most recent med list.  
  
  
  
  
 ALPRAZolam 0.5 mg tablet Commonly known as:  XANAX  
TAKE 1 TABLET BY MOUTH TWO TIMES DAILY AS NEEDED FOR ANXIETY  
  
 gabapentin 300 mg capsule Commonly known as:  NEURONTIN Take 3 Caps by mouth three (3) times daily. HYDROcodone-acetaminophen 5-325 mg per tablet Commonly known as:  NORCO  
TAKE 1 TABLET BY MOUTH EVERY 4 HOURS AS NEEDED FOR PAIN  
  
 pantoprazole 40 mg tablet Commonly known as:  PROTONIX  
TAKE 1 TABLET BY MOUTH TWO TIMES DAILY  
  
 sucralfate 1 gram tablet Commonly known as:  CARAFATE  
TAKE 1 TABLET BY MOUTH FOUR TIMES DAILY  
  
 traMADol 50 mg tablet Commonly known as:  ULTRAM  
Take 1 Tab by mouth every six (6) hours as needed for Pain. Max Daily Amount: 200 mg. Prescriptions Printed Refills  
 traMADol (ULTRAM) 50 mg tablet 0 Sig: Take 1 Tab by mouth every six (6) hours as needed for Pain. Max Daily Amount: 200 mg. Class: Print Route: Oral  
  
Prescriptions Sent to Pharmacy Refills  
 gabapentin (NEURONTIN) 300 mg capsule 2 Sig: Take 3 Caps by mouth three (3) times daily. Class: Normal  
 Pharmacy: [facility] [address], [address] [place] [address] Ph #: [phone] Route: Oral  
  
To-Do List   
 09/15/2018 Neurology:  EMG LIMITED   
  
 09/15/2018 Imaging:  MRI LUMB SPINE W WO CONT Patient Instructions PRESCRIPTION REFILL POLICY [facility] Statement to Patients April 1, 2014 In an effort to ensure the large volume of patient prescription refills is processed in the most efficient and expeditious manner, we are asking our patients to assist us by calling your Pharmacy for all prescription refills, this will include also your  Mail Order Pharmacy. The pharmacy will contact our office electronically to continue the refill process. Please do not wait until the last minute to call your pharmacy. We need at least 48 hours (2days) to fill prescriptions. We also encourage you to call your pharmacy before going to  your prescription to make sure it is ready. With regard to controlled substance prescription refill requests (narcotic refills) that need to be picked up at our office, we ask your cooperation by providing us with at least 72 hours (3days) notice that you will need a refill. We will not refill narcotic prescription refill requests after 4:00pm on any weekday, Monday through Thursday, or after 2:00pm on Fridays, or on the weekends. We encourage everyone to explore another way of getting your prescription refill request processed using [employer], our patient web portal through our electronic medical record system. [employer] is an efficient and effective way to communicate your medication request directly to the office and  downloadable as an kojo on your smart phone . [employer] also features a review functionality that allows you to view your medication list as well as leave messages for your physician. Are you ready to get connected? If so please review the attatched instructions or speak to any of our staff to get you set up right away! Thank you so much for your cooperation. Should you have any questions please contact our Practice Administrator. The Physicians and Staff,  [facility] Patient Instruction Plan/ Result Policy If we have ordered testing for you, know that; \"NO NEWS IS GOOD NEWS! \" It is our policy that we know longer call patients with results, nor do we  give test results over the phone. We schedule follow up appointments so that your results can be discussed in person. This allows you to address any questions you have regarding the results. If something of concern is revealed on your test, we will contact you to discuss the matter and if needed schedule a sooner follow up appointment.  
 Additionally, results may be found by using the My Chart feature and one of our patient service representatives at the  can give you instructions on how to access this feature to utilize our electronic medical record system. Thank you for your understanding. Introducing [facility]! [employer] introduces [employer] patient portal. Now you can access parts of your medical record, email your doctor's office, and request medication refills online. 1. In your internet browser, go to [email or web address] 2. Click on the First Time User? Click Here link in the Sign In box. You will see the New Member Sign Up page. 3. Enter your [employer] Access Code exactly as it appears below. You will not need to use this code after youve completed the sign-up process. If you do not sign up before the expiration date, you must request a new code. · [employer] Access Code: [email or web address] Expires: 12/13/2018  2:02 PM 
 
4. Enter the last four digits of your Social Security Number (xxxx) and Date of Birth (mm/dd/yyyy) as indicated and click Submit. You will be taken to the next sign-up page. 5. Create a [employer] ID. This will be your [employer] login ID and cannot be changed, so think of one that is secure and easy to remember. 6. Create a [employer] password. You can change your password at any time. 7. Enter your Password Reset Question and Answer. This can be used at a later time if you forget your password. 8. Enter your e-mail address. You will receive e-mail notification when new information is available in [address] 9. Click Sign Up. You can now view and download portions of your medical record. 10. Click the Download Summary menu link to download a portable copy of your medical information. If you have questions, please visit the Frequently Asked Questions section of the [employer] website. Remember, [employer] is NOT to be used for urgent needs. For medical emergencies, dial 911. Now available from your iPhone and Android! Please provide this summary of care documentation to your next provider. Your primary care clinician is listed as [address]  If you have any questions after today's visit, please call [phone].

## 2018-09-14 NOTE — PATIENT INSTRUCTIONS
PRESCRIPTION REFILL POLICY [facility] Statement to Patients April 1, 2014 In an effort to ensure the large volume of patient prescription refills is processed in the most efficient and expeditious manner, we are asking our patients to assist us by calling your Pharmacy for all prescription refills, this will include also your  Mail Order Pharmacy. The pharmacy will contact our office electronically to continue the refill process. Please do not wait until the last minute to call your pharmacy. We need at least 48 hours (2days) to fill prescriptions. We also encourage you to call your pharmacy before going to  your prescription to make sure it is ready. With regard to controlled substance prescription refill requests (narcotic refills) that need to be picked up at our office, we ask your cooperation by providing us with at least 72 hours (3days) notice that you will need a refill. We will not refill narcotic prescription refill requests after 4:00pm on any weekday, Monday through Thursday, or after 2:00pm on Fridays, or on the weekends. We encourage everyone to explore another way of getting your prescription refill request processed using [employer], our patient web portal through our electronic medical record system. [employer] is an efficient and effective way to communicate your medication request directly to the office and  downloadable as an kojo on your smart phone . [employer] also features a review functionality that allows you to view your medication list as well as leave messages for your physician. Are you ready to get connected? If so please review the attatched instructions or speak to any of our staff to get you set up right away! Thank you so much for your cooperation. Should you have any questions please contact our Practice Administrator. The Physicians and Staff,  [facility] Patient Instruction Plan/ Result Policy If we have ordered testing for you, know that; \"NO NEWS IS GOOD NEWS! \" It is our policy that we know longer call patients with results, nor do we  give test results over the phone. We schedule follow up appointments so that your results can be discussed in person. This allows you to address any questions you have regarding the results. If something of concern is revealed on your test, we will contact you to discuss the matter and if needed schedule a sooner follow up appointment. Additionally, results may be found by using the My Chart feature and one of our patient service representatives at the  can give you instructions on how to access this feature to utilize our electronic medical record system. Thank you for your understanding.

## 2018-09-20 ENCOUNTER — HOSPITAL ENCOUNTER (OUTPATIENT)
Dept: MRI IMAGING | Age: 43
Discharge: HOME | End: 2018-09-20
Attending: PSYCHIATRY & NEUROLOGY
Payer: MEDICAID

## 2018-09-20 DIAGNOSIS — N50.812 PAIN IN LEFT TESTICLE: ICD-10-CM

## 2018-09-20 DIAGNOSIS — M79.652 LEFT THIGH PAIN: ICD-10-CM

## 2018-09-20 PROCEDURE — 72158 MRI LUMBAR SPINE W/O & W/DYE: CPT

## 2018-09-20 PROCEDURE — A9575 INJ GADOTERATE MEGLUMI 0.1ML: HCPCS | Performed by: PSYCHIATRY & NEUROLOGY

## 2018-09-20 PROCEDURE — 74011250636 HC RX REV CODE- 250/636: Performed by: PSYCHIATRY & NEUROLOGY

## 2018-09-20 RX ORDER — GADOTERATE MEGLUMINE 376.9 MG/ML
15 INJECTION INTRAVENOUS
Status: COMPLETED | OUTPATIENT
Start: 2018-09-20 | End: 2018-09-20

## 2018-09-20 RX ADMIN — GADOTERATE MEGLUMINE 15 ML: 376.9 INJECTION INTRAVENOUS at 17:45

## 2018-09-27 NOTE — TELEPHONE ENCOUNTER
----- Message from Georgina Lion sent at 9/27/2018  3:07 PM EDT -----  Regarding: Dr. Timo Boothe  The patient's wife Gregory Blanca is requesting a call back in regards to the patient running out of Rx Tramadol in a couple of days and if it will be refilled since the patient was told to wait for the Rx Gabapentin to work.  Mrs. Laura Zhong also stated that they received a letter from the insurance company that a prior authorization would have to be submitted for Rx Tramadol. ([id number])[phone]

## 2018-10-02 NOTE — TELEPHONE ENCOUNTER
Patient's spouse calling in regards to med refills for patient. Patient has called twice for gabapentin and tramadol refill. Spouse would like a call back as soon as possible today regarding status of medication refill. Spouse also sent message through [employer] as well. Stated that patient has been trying since last week to get meds refilled. Please call back today.  [phone]

## 2018-10-03 ENCOUNTER — TELEPHONE (OUTPATIENT)
Dept: NEUROLOGY | Age: 43
End: 2018-10-03

## 2018-10-03 RX ORDER — GABAPENTIN 300 MG/1
CAPSULE ORAL
Qty: 360 CAP | Refills: 2 | Status: SHIPPED | OUTPATIENT
Start: 2018-10-03 | End: 2018-11-26

## 2018-10-03 NOTE — TELEPHONE ENCOUNTER
PATIENT WOULD LIKE 4 TABLETS tid. PER PATIENT WIFE YOU STATED THAT THEY COULD INCREASE TO THIS AMOUNT.     PLEASE REVIEW AND SIGN

## 2018-10-26 ENCOUNTER — OFFICE VISIT (OUTPATIENT)
Dept: NEUROLOGY | Age: 43
End: 2018-10-26

## 2018-10-26 ENCOUNTER — TELEPHONE (OUTPATIENT)
Dept: NEUROLOGY | Age: 43
End: 2018-10-26

## 2018-10-26 VITALS
HEART RATE: 95 BPM | HEIGHT: 70 IN | RESPIRATION RATE: 20 BRPM | BODY MASS INDEX: 25.05 KG/M2 | DIASTOLIC BLOOD PRESSURE: 82 MMHG | OXYGEN SATURATION: 96 % | SYSTOLIC BLOOD PRESSURE: 124 MMHG | WEIGHT: 175 LBS

## 2018-10-26 DIAGNOSIS — F41.9 ANXIETY: Primary | ICD-10-CM

## 2018-10-26 RX ORDER — CARBAMAZEPINE 200 MG/1
200 TABLET, EXTENDED RELEASE ORAL 2 TIMES DAILY
Qty: 60 TAB | Refills: 5 | Status: SHIPPED | OUTPATIENT
Start: 2018-10-26 | End: 2019-02-12

## 2018-10-26 RX ORDER — BACLOFEN 10 MG/1
10 TABLET ORAL
Qty: 90 TAB | Refills: 5 | Status: SHIPPED | OUTPATIENT
Start: 2018-10-26

## 2018-10-26 RX ORDER — ALPRAZOLAM 0.5 MG/1
TABLET ORAL
Qty: 60 TAB | Refills: 0 | Status: SHIPPED | OUTPATIENT
Start: 2018-10-26

## 2018-10-26 NOTE — LETTER
Neurology Progress Note Patient ID: Abby Amador [id number] 
37 y.o. 
1975 HISTORY PROVIDED BY: 
Patient Family Member: wife Chief Complaint: trigeminal neuralgia and inoperable astrocytoma Subjective:  
 Mr. Gui Rashid is here for follow up today of facial pain. He was on gabapentin and we did a one time dose of tramadol. He is off the tramadol and says he felt like the gabapentin was working at 70% but then in the last 2 weeks it has stopped helping. He is having tingling on that side. He is on gabapentin 1200mg TID. He has had to stay in bed due to pain. His memory has gotten much worse on the gabapentin. I did review his previous records from [address] [place] [address] which do identified that he was on pain medication and they had recommended weaning him off of this. Discussed with patient that we will not be feeling additional tramadol for him. He is aware. He does take Xanax for anxiety. He needs to establish with oncology at [facility]. He has an appointment in 1 week. Discussed with him we will give him medication for 1 month. Cold seems to be a trigger for him. He also continues with testicular pain but it does not seem to be as prominent as it was at his first visit. He is now established with [facility] for his astrocytoma and has an MRI scheduled next week. Objective:  
ROS: 
Per HPI- 
Otherwise 12 point ROS was negative Meds: 
Current Outpatient Medications on File Prior to Visit Medication Sig Dispense Refill  gabapentin (NEURONTIN) 300 mg capsule Take 4 capsules by mouth 3 times daily 360 Cap 2  
 ALPRAZolam (XANAX) 0.5 mg tablet TAKE 1 TABLET BY MOUTH TWO TIMES DAILY AS NEEDED FOR ANXIETY  2  
 HYDROcodone-acetaminophen (NORCO) 5-325 mg per tablet TAKE 1 TABLET BY MOUTH EVERY 4 HOURS AS NEEDED FOR PAIN  0  
 pantoprazole (PROTONIX) 40 mg tablet TAKE 1 TABLET BY MOUTH TWO TIMES DAILY  2  
 sucralfate (CARAFATE) 1 gram tablet TAKE 1 TABLET BY MOUTH FOUR TIMES DAILY  3  
  traMADol (ULTRAM) 50 mg tablet Take 1 Tab by mouth every six (6) hours as needed for Pain. Max Daily Amount: 200 mg. 60 Tab 0 No current facility-administered medications on file prior to visit. Imaging: MRI lumbar spine: Negative Reviewed records in [facility] today Lab Review No results found for this or any previous visit. Exam: 
Visit Vitals /82 Pulse 95 Resp 20 Ht 5' 10\" (1.778 m) Wt 79.4 kg (175 lb) SpO2 96% BMI 25.11 kg/m² Gen: Well developed CV: RRR Lungs: non labored breathing Abd: non distending Neuro: A&O x 3, no dysarthria or aphasia CN II-XII: PERRL, EOMI, face symmetric, tongue/palate midline Motor: strength 5/5 all four ext Sensory: intact to LT Gait: Antalgic gait Assessment:  
Ashutosh Guerrero is a 37 y.o. male who presents for follow up of facial pain, headache, and testicular pain. 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 Patient today presents with facial pain and testicular pain. He reports some radiation of pain down his left thigh as well. MRI of the lumbar spine was negative. Patient did not keep his EMG appointment due to having too much pain that day. We will change him from gabapentin to Tegretol and get him back then to take as needed to help with the trigeminal neuralgia. Is an MRI of the brain scheduled in October with [facility]. He is established now with [facility] neurosurgery. He is also established with oncology at [facility]. They will continue to follow his Xanax prescription once he establishes with them next week. Plan:  
 
Visit Diagnoses Trigeminal neuralgia of left side of face    -  Primary Relevant Medications  
 gabapentin (NEURONTIN) 300 mg capsule will wean down on this medication No further tramadol refills Start Tegretol- mg twice a day schedule given for this Will need to follow-up blood work at next visit Document 10 mg as needed 3 times a day Left thigh pain/pain in left testicle Relevant Medications Gabapentin Other Relevant Orders MRI LUMB SPINE W WO CONT negative EMG LIMITED at follow-up if pain is persistent Anaplastic astrocytoma of brain ([address] Relevant Medications Recent MRI with stable tumorhas another one scheduled with [facility] at the end of October Encourage patient to establish with [facility] brain tumor clinic or [facility] FU in 4 weeks Signed: 
Gabby Lui MD 
10/26/2018 Medications and side effects discussed with patient in detail. With any new medications prescribed, patient was given instructions on administration and side effects. Written medication information was provided to the patient as well. This note was created using voice recognition software. Despite editing, there may be syntax errors. This note will not be viewable in [address] No chief complaint on file. 1. Have you been to the ER, urgent care clinic since your last visit? Hospitalized since your last visit? No 
 
2. Have you seen or consulted any other health care providers outside of the [address] [place] [address] since your last visit? Include any pap smears or colon screening. No  
Visit Vitals /82 Pulse 95 Resp 20 Ht 5' 10\" (1.778 m) Wt 79.4 kg (175 lb) SpO2 96% BMI 25.11 kg/m²

## 2018-10-26 NOTE — TELEPHONE ENCOUNTER
----- Message from Victor M Calderon sent at 10/26/2018  1:38 PM EDT -----  Regarding: Dr. Debbi Hutson  The patient's wife Jenni Baig is requesting that the doctor gives prior authorization for Rx Carbamazepine to the insurance company. Mrs. Alma Boudreaux stated that if the doctor writes urgent that the insurance company will process it faster.  (c)[phone]

## 2018-10-26 NOTE — PROGRESS NOTES
No chief complaint on file. 1. Have you been to the ER, urgent care clinic since your last visit? Hospitalized since your last visit? No 
 
2. Have you seen or consulted any other health care providers outside of the [address] [place] [address] since your last visit? Include any pap smears or colon screening. No  
Visit Vitals /82 Pulse 95 Resp 20 Ht 5' 10\" (1.778 m) Wt 79.4 kg (175 lb) SpO2 96% BMI 25.11 kg/m²

## 2018-10-26 NOTE — PATIENT INSTRUCTIONS
Patient Instructions/Plans: 
Week 1: 
Take gabapentin 2 tabs three times a day Take Tegretol 1/2 tab twice a day Week 2: 
Take gabapentin 1 tab three times a day Take Tegretol 1 tab twice a day Week 3: 
Stop gabapentin and only take Tegretol 1 tab twice a day Take baclofen up to three times a day as needed Carbamazepine (By mouth) Carbamazepine ([place]) Treats seizures, nerve pain, or bipolar disorder. Brand Name(s): Carbatrol, Epitol, Equetro, TEGretol, TEGretol-XR There may be other brand names for this medicine. When This Medicine Should Not Be Used: This medicine is not right for everyone. Do not use it if you had an allergic reaction to carbamazepine or a tricyclic antidepressant or if you are pregnant. How to Use This Medicine:  
Long Acting Capsule, Liquid, Tablet, Chewable Tablet, Long Acting Tablet · Take your medicine as directed. Your dose may need to be changed several times to find what works best for you. This medicine may be used alone or together with other seizure medicines. · Extended-release capsule: ¨ Swallow the capsule whole. Do not break, crush, or chew it. ¨ You may open the capsule and pour the medicine into a small amount of soft food such as pudding, yogurt, or applesauce. Stir this mixture well and swallow it without chewing. · Chewable tablet:  
¨ It is best to take this medicine with food or milk. ¨ Chew the tablet well before swallowing it. · Liquid:  
¨ It is best to take this medicine with food or milk. ¨ Measure the oral liquid medicine with a marked measuring spoon, oral syringe, or medicine cup. Shake the bottle well just before each use. ¨ Do not take this medicine at the same time as other oral liquid medicines. · Tablet:  
¨ It is best to take this medicine with food or milk. ¨ Swallow the tablet whole. Do not crush, break, or chew it. ¨ Do not use an extended-release tablet that is cracked or chipped. · This medicine should come with a Medication Guide. Ask your pharmacist for a copy if you do not have one. · Missed dose: Take a dose as soon as you remember. If it is almost time for your next dose, wait until then and take a regular dose. Do not take extra medicine to make up for a missed dose. · Store the medicine in a closed container at room temperature, away from heat, moisture, and direct light. Drugs and Foods to Avoid: Ask your doctor or pharmacist before using any other medicine, including over-the-counter medicines, vitamins, and herbal products. · Do not use this medicine together with nefazodone, delavirdine, or certain other medicines for HIV or AIDS (including efavirenz, etravirine). Do not use this medicine and an MAO inhibitor (MAOI) within 14 days of each other. · The list below includes some of the most commonly used medicines that can interact with carbamazepine. There are many other drugs not listed. Make sure your doctor knows the names of all the medicines you use. Tell your doctor if you are using any of the following: ¨ Alprazolam, aprepitant, aripiprazole, buspirone, chloroquine, citalopram, clarithromycin, clomipramine, clonazepam, clozapine, cyclophosphamide, cyclosporine, diltiazem, doxorubicin, erythromycin, everolimus, felodipine, fluoxetine, imatinib, isoniazid, lamotrigine, lapatinib, lithium, loxapine, mefloquine, methadone, phenytoin, praziquantel, primidone, propoxyphene, quetiapine, quinine, sirolimus, tacrolimus, temsirolimus, tramadol, trazodone, triazolam, valproate, verapamil, or zileuton ¨ HIV protease inhibitor (including atazanavir, darunavir, fosamprenavir, indinavir, lopinavir, ritonavir, saquinavir) ¨ Medicine to treat fungal infection (including fluconazole, itraconazole, ketoconazole, voriconazole) ¨ Steroid (including dexamethasone, prednisolone, prednisone) · Birth control pills, shots, and other hormonal birth control methods may not work as well while you use this medicine. You may want to use a second form of birth control. · Do not eat grapefruit or drink grapefruit juice while you are using this medicine. · Tell your doctor if you use anything else that makes you sleepy. Some examples are allergy medicine, narcotic pain medicine, and alcohol. Warnings While Using This Medicine: · It is not safe to take this medicine during pregnancy. It could harm an unborn baby. Tell your doctor right away if you become pregnant. · Tell your doctor if you are breastfeeding, or if you have kidney disease, liver disease, glaucoma, heart disease, heart rhythm problems, porphyria, an intolerance to fructose, or a history of bone marrow depression, suicidal thoughts, or depression. Tell your doctor if you had an allergic reaction to any other medicine (especially seizure medicines). · Tell your doctor if you have  ancestry. Your doctor may test you for serious skin reactions before prescribing this medicine. · This medicine may cause the following problems: 
¨ Serious skin reactions ¨ Aplastic anemia or other blood problems ¨ Drug reaction with eosinophilia and systemic symptoms (DRESS), which may damage organs such as the liver, kidney, or heart ¨ Suicidal thoughts or behavior ¨ Low sodium levels in the blood ¨ Liver damage · Do not stop using this medicine suddenly. Your doctor will need to slowly decrease your dose before you stop it completely. · Tell any doctor or dentist who treats you that you are using this medicine. This medicine may affect certain medical test results. · This medicine may make you dizzy or drowsy. Do not drive or do anything else that could be dangerous until you know how this medicine affects you. · Your doctor will do lab tests at regular visits to check on the effects of this medicine. Keep all appointments. Your doctor may want to have your eyes checked by an eye doctor. · Keep all medicine out of the reach of children. Never share your medicine with anyone. Possible Side Effects While Using This Medicine:  
Call your doctor right away if you notice any of these side effects: · Allergic reaction: Itching or hives, swelling in your face or hands, swelling or tingling in your mouth or throat, chest tightness, trouble breathing · Blistering, peeling, red skin rash · Blurred vision, changes in vision · Change in how much or how often you urinate · Chest pain, trouble breathing, cold sweats, bluish skin · Confusion, memory problems, unusual tiredness, muscle spasms or weakness · Dark urine or pale stools, nausea, vomiting, loss of appetite, stomach pain, yellow skin or eyes · Fast, slow, pounding, or uneven heartbeat · Fever, chills, cough, sore throat, or sores in your mouth · Lightheadedness or fainting · Swollen, painful, or tender lymph glands in your neck, armpit, or groin · Thoughts of hurting yourself or others, unusual thoughts or behavior · Unusual bleeding, bruising, or weakness If you notice these less serious side effects, talk with your doctor: · Anxiety, agitation, depression, restlessness, or trouble sleeping · Dizziness or drowsiness · Dry mouth · Mild nausea, vomiting, constipation If you notice other side effects that you think are caused by this medicine, tell your doctor. Call your doctor for medical advice about side effects. You may report side effects to FDA at 2-432-FDA-2778 © 2017 [address]  Information is for End User's use only and may not be sold, redistributed or otherwise used for commercial purposes. The above information is an  only. It is not intended as medical advice for individual conditions or treatments. Talk to your doctor, nurse or pharmacist before following any medical regimen to see if it is safe and effective for you. Baclofen (By injection) Baclofen (RADHA-abilio-fen) Treats muscle spasms. This medicine is a muscle relaxer. Brand Name(s): Gablofen, Lioresal Intrathecal, Lioresal Intrathecal Refill Kit, Lioresal Intrathecal Screening Kit There may be other brand names for this medicine. When This Medicine Should Not Be Used: This medicine is not right for everyone. Do not use it if you had an allergic reaction to baclofen. How to Use This Medicine:  
Injectable · Your doctor will prescribe your exact dose. Your dose may need to be changed several times in order to find out what works best for you. This medicine is given through a needle directly into your spinal cord. · You will first be given 1 or 2 test doses, to see if this medicine will work for you. A nurse or other trained health professional will give you this test dose in a clinic or hospital. You will need to stay for 4 to 8 hours to make sure the medicine does not cause any problems. · If your muscle spasms get better with the test dose, you may be able to start using this medicine all the time. You will need to have a pump placed under your skin, which will pump medicine directly into your back all day. · This medicine should come with a Medication Guide. Ask your pharmacist for a copy if you do not have one. · Your family or other caregivers need to know how to use your pump and medicine. Make sure you and your caregivers all know these 3 things: (1) Signs that you are getting too much or too little medicine. (2) What to do if you have any of these signs. (3) How to care for the pump and the needle that is placed in your back. · Missed dose: You must use this medicine on a fixed schedule. Call your doctor or pharmacist if you miss a dose. Drugs and Foods to Avoid: Ask your doctor or pharmacist before using any other medicine, including over-the-counter medicines, vitamins, and herbal products. · Some medicines can affect how baclofen works.  Tell your doctor if you use anything else that makes you sleepy. Some examples are allergy medicine, narcotic pain medicine, and alcohol. Warnings While Using This Medicine: · Tell your doctor if you are pregnant or breastfeeding, or if you have kidney disease or any kind of infection. Tell your doctor if you used baclofen before, and if you had problems when you stopped using it. · Ask your doctor for an Emergency Card listing the symptoms that may happen if you get too much or too little medicine. Carry the card with you at all times. Learn what the pump alarm sounds like and what to do if the alarm goes off. · Do not stop taking this medicine suddenly without asking your doctor. You may need to decrease your dose slowly before you stop taking it completely. · Your doctor will need to check your progress at regular visits while you are using this medicine. Be sure to keep all appointments. · This medicine may make you dizzy or drowsy. Do not drive or do anything that could be dangerous until you know how this medicine affects you. · Keep all medicine out of the reach of children. Never share your medicine with anyone. Possible Side Effects While Using This Medicine:  
Call your doctor right away if you notice any of these side effects: · Allergic reaction: Itching or hives, swelling in your face or hands, swelling or tingling in your mouth or throat, chest tightness, trouble breathing · Decrease in how much or how often you urinate · Fainting, shallow breathing, seizures · High fever, increased muscle spasms or stiffness, confusion, lightheadedness, numbness, tingling · Muscle weakness · Pain, redness, or swelling around the pump or needle · Unusual sleepiness, tiredness, or lightheadedness If you notice these less serious side effects, talk with your doctor: · Agitation, mild headache · Nausea, vomiting, constipation If you notice other side effects that you think are caused by this medicine, tell your doctor. Call your doctor for medical advice about side effects. You may report side effects to FDA at 9-295-GAG-4264 © 2017 [facility] Information is for End User's use only and may not be sold, redistributed or otherwise used for commercial purposes. The above information is an  only. It is not intended as medical advice for individual conditions or treatments. Talk to your doctor, nurse or pharmacist before following any medical regimen to see if it is safe and effective for you.

## 2018-10-26 NOTE — PROGRESS NOTES
Neurology Progress Note Patient ID: Ashutosh Guerrero [id number] 
37 y.o. 
1975 HISTORY PROVIDED BY: 
Patient Family Member: wife Chief Complaint: trigeminal neuralgia and inoperable astrocytoma Subjective:  
 Mr. Denisse Boone is here for follow up today of facial pain. He was on gabapentin and we did a one time dose of tramadol. He is off the tramadol and says he felt like the gabapentin was working at 70% but then in the last 2 weeks it has stopped helping. He is having tingling on that side. He is on gabapentin 1200mg TID. He has had to stay in bed due to pain. His memory has gotten much worse on the gabapentin. I did review his previous records from [facility] which do identified that he was on pain medication and they had recommended weaning him off of this. Discussed with patient that we will not be feeling additional tramadol for him. He is aware. He does take Xanax for anxiety. He needs to establish with oncology at [facility]. He has an appointment in 1 week. Discussed with him we will give him medication for 1 month. Cold seems to be a trigger for him. He also continues with testicular pain but it does not seem to be as prominent as it was at his first visit. He is now established with [facility] for his astrocytoma and has an MRI scheduled next week. Objective:  
ROS: 
Per HPI- 
Otherwise 12 point ROS was negative Meds: 
Current Outpatient Medications on File Prior to Visit Medication Sig Dispense Refill  gabapentin (NEURONTIN) 300 mg capsule Take 4 capsules by mouth 3 times daily 360 Cap 2  
 ALPRAZolam (XANAX) 0.5 mg tablet TAKE 1 TABLET BY MOUTH TWO TIMES DAILY AS NEEDED FOR ANXIETY  2  
 HYDROcodone-acetaminophen (NORCO) 5-325 mg per tablet TAKE 1 TABLET BY MOUTH EVERY 4 HOURS AS NEEDED FOR PAIN  0  
 pantoprazole (PROTONIX) 40 mg tablet TAKE 1 TABLET BY MOUTH TWO TIMES DAILY  2  
 sucralfate (CARAFATE) 1 gram tablet TAKE 1 TABLET BY MOUTH FOUR TIMES DAILY  3  
  traMADol (ULTRAM) 50 mg tablet Take 1 Tab by mouth every six (6) hours as needed for Pain. Max Daily Amount: 200 mg. 60 Tab 0 No current facility-administered medications on file prior to visit. Imaging: MRI lumbar spine: Negative Reviewed records in [facility] and [facility] tab today Lab Review No results found for this or any previous visit. Exam: 
Visit Vitals /82 Pulse 95 Resp 20 Ht 5' 10\" (1.778 m) Wt 79.4 kg (175 lb) SpO2 96% BMI 25.11 kg/m² Gen: Well developed CV: RRR Lungs: non labored breathing Abd: non distending Neuro: A&O x 3, no dysarthria or aphasia CN II-XII: PERRL, EOMI, face symmetric, tongue/palate midline Motor: strength 5/5 all four ext Sensory: intact to LT Gait: Antalgic gait Assessment:  
Duyen Haas is a 37 y.o. male who presents for follow up of facial pain, headache, and testicular pain. 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 Patient today presents with facial pain and testicular pain. He reports some radiation of pain down his left thigh as well. MRI of the lumbar spine was negative. Patient did not keep his EMG appointment due to having too much pain that day. We will change him from gabapentin to Tegretol and get him back then to take as needed to help with the trigeminal neuralgia. Is an MRI of the brain scheduled in October with [facility]. He is established now with [facility] neurosurgery. He is also established with oncology at [facility]. They will continue to follow his Xanax prescription once he establishes with them next week. Plan:  
 
Visit Diagnoses Trigeminal neuralgia of left side of face    -  Primary Relevant Medications  
 gabapentin (NEURONTIN) 300 mg capsule will wean down on this medication No further tramadol refills Start Tegretol- mg twice a day schedule given for this Will need to follow-up blood work at next visit Document 10 mg as needed 3 times a day Left thigh pain/pain in left testicle Relevant Medications Gabapentin Other Relevant Orders MRI LUMB SPINE W WO CONT negative EMG LIMITED at follow-up if pain is persistent Anaplastic astrocytoma of brain ([address] Relevant Medications Recent MRI with stable tumorhas another one scheduled with [facility] at the end of October Encourage patient to establish with [facility] brain tumor clinic or [facility] FU in 4 weeks Signed: 
Juan Alberto Thapa MD 
10/26/2018 Medications and side effects discussed with patient in detail. With any new medications prescribed, patient was given instructions on administration and side effects. Written medication information was provided to the patient as well. This note was created using voice recognition software. Despite editing, there may be syntax errors. This note will not be viewable in [address]

## 2018-11-26 ENCOUNTER — OFFICE VISIT (OUTPATIENT)
Dept: NEUROLOGY | Age: 43
End: 2018-11-26

## 2018-11-26 VITALS
DIASTOLIC BLOOD PRESSURE: 84 MMHG | WEIGHT: 165 LBS | SYSTOLIC BLOOD PRESSURE: 120 MMHG | BODY MASS INDEX: 23.62 KG/M2 | HEIGHT: 70 IN

## 2018-11-26 DIAGNOSIS — C71.9 ANAPLASTIC ASTROCYTOMA (HCC): Primary | ICD-10-CM

## 2018-11-26 RX ORDER — BACLOFEN 20 MG/1
20 TABLET ORAL 3 TIMES DAILY
Qty: 90 TAB | Refills: 5 | Status: SHIPPED | OUTPATIENT
Start: 2018-11-26 | End: 2019-05-05 | Stop reason: SDUPTHER

## 2018-11-26 RX ORDER — PREDNISONE 20 MG/1
1 TABLET ORAL DAILY
Refills: 2 | COMMUNITY
Start: 2018-09-27

## 2018-11-26 NOTE — PROGRESS NOTES
Patient is here for follow up. Gabapentin did not help neuralgia. Switched to tegretol which helps eliminate pin and needle feeling on his face. Still has bruised, painful feeling on left side of face. Cannot sleep on left side of face. Accompanied by wife, beni.

## 2018-11-26 NOTE — PATIENT INSTRUCTIONS
[address] [facility]   Statement to Patients  April 1, 2014      In an effort to ensure the large volume of patient prescription refills is processed in the most efficient and expeditious manner, we are asking our patients to assist us by calling your Pharmacy for all prescription refills, this will include also your  Mail Order Pharmacy. The pharmacy will contact our office electronically to continue the refill process. Please do not wait until the last minute to call your pharmacy. We need at least 48 hours (2days) to fill prescriptions. We also encourage you to call your pharmacy before going to  your prescription to make sure it is ready. With regard to controlled substance prescription refill requests (narcotic refills) that need to be picked up at our office, we ask your cooperation by providing us with at least 72 hours (3days) notice that you will need a refill. We will not refill narcotic prescription refill requests after 4:00pm on any weekday, Monday through Thursday, or after 2:00pm on Fridays, or on the weekends. We encourage everyone to explore another way of getting your prescription refill request processed using X[employer], our patient web portal through our electronic medical record system. XING is an efficient and effective way to communicate your medication request directly to the office and  downloadable as an kojo on your smart phone . XING also features a review functionality that allows you to view your medication list as well as leave messages for your physician. Are you ready to get connected? If so please review the attatched instructions or speak to any of our staff to get you set up right away! Thank you so much for your cooperation. Should you have any questions please contact our Practice Administrator.     The Physicians and Staff,  [facility]

## 2018-11-27 NOTE — PROGRESS NOTES
Surinder Masterson is a 37 y.o. male who presents with the following  Chief Complaint   Patient presents with    Follow-up       HPI Patient comes in with wife and kids for follow up today of facial pain. He was on gabapentin and this made him feel really weird so was switched to 200 mg Tegretol XR BID and this has helped rid of the pins and needles feeling on the left side of his head but he is still having pain along the v1 route of the trigeminal nerve. He has symptoms in v2-v3 but the dagger, sharp, stabbing pain that comes and goes in the temple area is his biggest concern right now. It will bring him to his knees as it is very painful. Can come for seconds to longer. Sometimes he has had to stay in bed due to pain. He is now at [facility] for his brain tumor and things seem to be stable. Can consider sending to Dr. Juan David Colon if accept insurance to look at trigeminal neuralgia. He also continues with testicular pain but it does not seem to be as prominent as it was at his first visit. He is now established with [facility] for his astrocytoma and has recent MRI normal. Wants to get in with pain management as his pain is big concern. Also on Baclofen 10 mg TID     Allergies   Allergen Reactions    Fioricet [Butalbital-Acetaminophen-Caff] Unknown (comments)    Prozac [Fluoxetine] Unknown (comments)       Current Outpatient Medications   Medication Sig    predniSONE (DELTASONE) 20 mg tablet Take 1 Tab by mouth daily.  baclofen (LIORESAL) 20 mg tablet Take 1 Tab by mouth three (3) times daily.  baclofen (LIORESAL) 10 mg tablet Take 1 Tab by mouth three (3) times daily as needed.  carBAMazepine XR (TEGRETOL XR) 200 mg SR tablet Take 1 Tab by mouth two (2) times a day.     ALPRAZolam (XANAX) 0.5 mg tablet TAKE 1 TABLET BY MOUTH TWO TIMES DAILY AS NEEDED FOR ANXIETY    pantoprazole (PROTONIX) 40 mg tablet TAKE 1 TABLET BY MOUTH TWO TIMES DAILY    sucralfate (CARAFATE) 1 gram tablet TAKE 1 TABLET BY MOUTH FOUR TIMES DAILY     No current facility-administered medications for this visit. Social History     Tobacco Use   Smoking Status Former Smoker    Last attempt to quit: 2013    Years since quittin.2   Smokeless Tobacco Never Used       Past Medical History:   Diagnosis Date    Anxiety     Fatigue     Headache     Joint pain     Leg swelling     Memory loss     Musculoskeletal disorder     Neuropathy        History reviewed. No pertinent surgical history. History reviewed. No pertinent family history. Social History     Socioeconomic History    Marital status:      Spouse name: Not on file    Number of children: Not on file    Years of education: Not on file    Highest education level: Not on file   Tobacco Use    Smoking status: Former Smoker     Last attempt to quit: 2013     Years since quittin.2    Smokeless tobacco: Never Used   Substance and Sexual Activity    Alcohol use: No       Review of Systems   Eyes: Negative for blurred vision, double vision and photophobia. Respiratory: Negative for shortness of breath and wheezing. Cardiovascular: Negative for chest pain and palpitations. Gastrointestinal: Negative for nausea and vomiting. Neurological: Positive for sensory change and headaches. Negative for dizziness, tingling, tremors, seizures and loss of consciousness. Psychiatric/Behavioral: Negative for memory loss. The patient is nervous/anxious. Remainder of comprehensive review is negative. Physical Exam :    Visit Vitals  /84   Ht 5' 10\" (1.778 m)   Wt 74.8 kg (165 lb)   BMI 23.68 kg/m²       General: Well defined, nourished, and groomed individual in no acute distress.    Neck: Supple, nontender, no bruits, no pain with resistance to active range of motion.    Heart: Regular rate and rhythm, no murmurs, rub, or gallop. Normal S1S2.   Lungs: Clear to auscultation bilaterally with equal chest expansion, no cough, no wheeze  Musculoskeletal: Extremities revealed no edema and had full range of motion of joints.    Psych: Good mood and bright affect    NEUROLOGICAL EXAMINATION:    Mental Status: Alert and oriented to person, place, and time    Cranial Nerves:    II, III, IV, VI: Visual acuity grossly intact. Visual fields are normal.    Pupils are equal, round, and reactive to light and accommodation.    Extra-ocular movements are full and fluid. Fundoscopic exam was benign, no ptosis or nystagmus.    V-XII: Hearing is grossly intact. Facial features are symmetric, with normal sensation and strength. The palate rises symmetrically and the tongue protrudes midline. Sternocleidomastoids 5/5. Motor Examination: Normal tone, bulk, and strength, 5/5 muscle strength throughout. Coordination: Finger to nose was normal. No resting or intention tremor    Gait and Station: Steady while walking. Normal arm swing. No pronator drift. No muscle wasting or fasiculations noted. Reflexes: DTRs 2+ throughout. No results found for this or any previous visit. Orders Placed This Encounter    REFERRAL TO PAIN MANAGEMENT     Referral Priority:   Routine     Referral Type:   Consultation     Referral Reason:   Specialty Services Required     Referred to Provider:   Ida Salcedo MD     Number of Visits Requested:   1    predniSONE (DELTASONE) 20 mg tablet     Sig: Take 1 Tab by mouth daily. Refill:  2    baclofen (LIORESAL) 20 mg tablet     Sig: Take 1 Tab by mouth three (3) times daily. Dispense:  90 Tab     Refill:  5       1. Anaplastic astrocytoma ([address]    2. Trigeminal neuralgia     Follow-up Disposition: Not on File      Tegretol  mg BID has really helped his symptoms but still having significant daily head pain. We discussed treatments. We will increase Baclofen to 20 mg TID to help. Can consider Elavil to help with anxiety, pain if needed.  Refer to pain management as we discussed do not do pain management he is aware. Continue to track his symptoms and can think about referral to Dr. Molly Tucker if neuralgia adds up as he feels like this is inhibiting him day to day. Wife agrees. Call with changes.        This note will not be viewable in [facility]

## 2019-01-18 ENCOUNTER — TELEPHONE (OUTPATIENT)
Dept: NEUROLOGY | Age: 44
End: 2019-01-18

## 2019-01-18 NOTE — TELEPHONE ENCOUNTER
Called and spoke with wife per Dr. Ford Led note. We discussed taking the Tegretol three times daily and since he took one in the morning, I suggested that he take one now and then before bedtime. She stated that she would call and let us know next week how he does.

## 2019-01-18 NOTE — TELEPHONE ENCOUNTER
----- Message from [facility] sent at 1/18/2019 12:57 PM EST -----  Regarding: Dr. Abel Walker: [phone]  Pt's wife is checking on the status of a call back because pt has a flare up and wanted to speak with the nurse and was given a call back as of yet.

## 2019-01-18 NOTE — TELEPHONE ENCOUNTER
Patient has been doing well with his Trigeminal neuralgia, Carbamazepine  mg BID and the Baclofen 10mg TID. But for the past 3 days it has come back and the pain is increasing. Please advise    Both Dr. Talya Elizondo and Annmarie Paige are not here to ask.

## 2019-01-18 NOTE — TELEPHONE ENCOUNTER
Pt taking Prednisone 20 mg/ day, Baclofen 20 mg TID, and Carbamazepine  mg BID. Advised him to increase the Carbamazepine to one tablet three times a day.

## 2019-02-12 ENCOUNTER — OFFICE VISIT (OUTPATIENT)
Dept: NEUROLOGY | Age: 44
End: 2019-02-12

## 2019-02-12 VITALS
HEART RATE: 97 BPM | HEIGHT: 70 IN | WEIGHT: 175 LBS | RESPIRATION RATE: 16 BRPM | SYSTOLIC BLOOD PRESSURE: 128 MMHG | DIASTOLIC BLOOD PRESSURE: 78 MMHG | BODY MASS INDEX: 25.05 KG/M2 | OXYGEN SATURATION: 98 %

## 2019-02-12 DIAGNOSIS — C71.9 ANAPLASTIC ASTROCYTOMA (HCC): Primary | ICD-10-CM

## 2019-02-12 DIAGNOSIS — G50.0 TRIGEMINAL NEURALGIA OF LEFT SIDE OF FACE: ICD-10-CM

## 2019-02-12 RX ORDER — ONDANSETRON 4 MG/1
4 TABLET, ORALLY DISINTEGRATING ORAL
Qty: 20 TAB | Refills: 5 | Status: SHIPPED | OUTPATIENT
Start: 2019-02-12

## 2019-02-12 RX ORDER — CARBAMAZEPINE 200 MG/1
TABLET, EXTENDED RELEASE ORAL
Qty: 90 TAB | Refills: 5 | Status: SHIPPED | OUTPATIENT
Start: 2019-02-12

## 2019-02-12 NOTE — PROGRESS NOTES
Justine Bonilla is a 37 y.o. male who presents with the following Chief Complaint Patient presents with  
 Other Trigeminal neuralgia HPI Patient comes in with wife and kids for follow up today of facial pain. He saw Gladys Kidney at least visit. He increased his baclofen to 20mg from 10mg. He did get benefit from this adjustment. He had an MRI of the brain that was stable on Jan 30, 2019. He follows at [facility] for his anaplastic astrocytoma. The following day from his MRI he had a seizure. He hadn't taken his tegretol dose that AM. He had a history of seizures with impaired consciousness but no GTC. He had CBD oil to take as needed and this stopped him from having a GTC. They started this daily and when he ran out is when he had the GTC. He went back on the CBD oil and has been okay. He is mostly affected by his fatigue. However he prefers this to the pain he was experiencing. We did discuss trigeminal neuralgia and its potential progression. He was taking Tegretol XR 200mg BID. This helped with the trigeminal pain but he had some breakthrough. He had developed some sinus pressure at that time. The did call the office to see if they can adjust meds. He was recommended to take the Tegrerol x 3 tabs. He didn't increase it due to issues with sleepiness with it. He will have head pain when he has the pain in his face. The severe railroad spike pain has improved but he is on the fringe of pain. It is always there but he can function. He has discomfort with touch on this left side. Recap: He was on gabapentin and this made him feel really weird so was switched to 200 mg Tegretol XR BID and this has helped rid of the pins and needles feeling on the left side of his head but he is still having pain along the v1 route of the trigeminal nerve.  He has symptoms in v2-v3 but the dagger, sharp, stabbing pain that comes and goes in the temple area is his biggest concern right now. It will bring him to his knees as it is very painful. Can come for seconds to longer. Sometimes he has had to stay in bed due to pain. He is now at [facility] for his brain tumor and things seem to be stable. Can consider sending to Dr. Eri Terrazas if accept insurance to look at trigeminal neuralgia. He also continues with testicular pain but it does not seem to be as prominent as it was at his first visit. He is now established with [facility] for his astrocytoma and has recent MRI normal. Wants to get in with pain management as his pain is big concern. Also on Baclofen 10 mg TID Allergies Allergen Reactions  Fioricet [Butalbital-Acetaminophen-Caff] Unknown (comments)  Prozac [Fluoxetine] Unknown (comments) Current Outpatient Medications Medication Sig  
 ondansetron (ZOFRAN ODT) 4 mg disintegrating tablet Take 1 Tab by mouth every eight (8) hours as needed for Nausea.  carBAMazepine XR (TEGRETOL XR) 200 mg SR tablet Take 1 tab in AM and 1 and 1/2 in PM  
 predniSONE (DELTASONE) 20 mg tablet Take 1 Tab by mouth daily.  baclofen (LIORESAL) 20 mg tablet Take 1 Tab by mouth three (3) times daily.  ALPRAZolam (XANAX) 0.5 mg tablet TAKE 1 TABLET BY MOUTH TWO TIMES DAILY AS NEEDED FOR ANXIETY  pantoprazole (PROTONIX) 40 mg tablet TAKE 1 TABLET BY MOUTH TWO TIMES DAILY  sucralfate (CARAFATE) 1 gram tablet TAKE 1 TABLET BY MOUTH FOUR TIMES DAILY  baclofen (LIORESAL) 10 mg tablet Take 1 Tab by mouth three (3) times daily as needed. No current facility-administered medications for this visit. Social History Tobacco Use Smoking Status Former Smoker  Last attempt to quit: 2013  Years since quittin.4 Smokeless Tobacco Never Used Past Medical History:  
Diagnosis Date  Anxiety  Fatigue  Headache  Joint pain  Leg swelling  Memory loss  Musculoskeletal disorder  Neuropathy Social History Socioeconomic History  Marital status:  Spouse name: Not on file  Number of children: Not on file  Years of education: Not on file  Highest education level: Not on file Tobacco Use  Smoking status: Former Smoker Last attempt to quit: 2013 Years since quittin.4  Smokeless tobacco: Never Used Substance and Sexual Activity  Alcohol use: No  
 
 
Review of Systems Eyes: Negative for blurred vision, double vision and photophobia. Respiratory: Negative for shortness of breath and wheezing. Cardiovascular: Negative for chest pain and palpitations. Gastrointestinal: Negative for nausea and vomiting. Neurological: Positive for sensory change and headaches. Negative for dizziness, tingling, tremors, seizures and loss of consciousness. Psychiatric/Behavioral: Negative for memory loss. The patient is nervous/anxious. Remainder of comprehensive review is negative. Imaging: MRI brain 19: IMPRESSION: 
1. Stable appearance of the signal abnormality centered in the left anteromedial temporal lobe extending into the left frontoparietal white matter. No evidence of tumor progression. Reviewed notes from patient's office visit in October at [facility]. This showed that his tumor was stable. Physical Exam : 
 
Visit Vitals /78 Pulse 97 Resp 16 Ht 5' 10\" (1.778 m) Wt 79.4 kg (175 lb) SpO2 98% BMI 25.11 kg/m² General: Well defined, nourished, and groomed individual in no acute distress.   
Neck: Supple, nontender, no bruits, no pain with resistance to active range of motion.   
Heart: Regular rate and rhythm, no murmurs, rub, or gallop. Normal S1S2. Lungs: Clear to auscultation bilaterally with equal chest expansion, no cough, no wheeze Musculoskeletal: Extremities revealed no edema and had full range of motion of joints.   
Psych: Good mood and bright affect NEUROLOGICAL EXAMINATION:   
 Mental Status: Alert and oriented to person, place, and time Cranial Nerves:   
II, III, IV, VI: Visual acuity grossly intact. Visual fields are normal.   
Pupils are equal, round, and reactive to light and accommodation.   
Extra-ocular movements are full and fluid. Fundoscopic exam was benign, no ptosis or nystagmus.   
V-XII: Hearing is grossly intact. Facial features are symmetric, with normal sensation and strength. The palate rises symmetrically and the tongue protrudes midline. Sternocleidomastoids 5/5. Motor Examination: Normal tone, bulk, and strength, 5/5 muscle strength throughout. Coordination: Finger to nose was normal. No resting or intention tremor Gait and Station: Steady while walking. Normal arm swing. No pronator drift. No muscle wasting or fasiculations noted. Reflexes: DTRs 2+ throughout. This is a 77-year-old gentleman with a history of anaplastic astrocytoma. He presents for follow-up of trigeminal neuralgia. Additionally he has had one generalized tonic-clonic seizure since his last visit. He does have a history of seizures but they are previously controlled with CBD oil. He does note that he was out of the CBD oil when the episode occurred. They have restarted this now. Trigeminal neuralgia is stable but still has some pain. He would like to adjust his Tegretol but is concerned about the potential fatigue effects. Given that he has had a seizure I would prefer her him to remain on a seizure medication so we will continue to try to adjust it. Visit Diagnoses Anaplastic astrocytoma ([facility] [address]    -  Primary Relevant Medications · ondansetron (ZOFRAN ODT) 4 mg disintegrating tablet for nausea associated with Tegretol · carBAMazepine XR (TEGRETOL XR) 200 mg SR tablet · Continue management per [facility] Seizure · carBAMazepine XR (TEGRETOL XR) 200 mg SR tablet · Patient also to continue CBD oil per his [facility] physician.   We did discuss that monitoring of drug levels of his Tegretol will be needed when he is on this since we do not know its potential effect as it is not FDA approved Trigeminal neuralgia of left side of face Relevant Medications · carBAMazepine XR (TEGRETOL XR) 200 mg SR tabletwill increase to 1 tablet in the morning and 1-1/2 tablet in the evening. New prescription written. · Patient has a level ordered to be checked in the next 2 weeks. Advised patient to fax us the level · Continue baclofen 20 mg up to 3 times a day. Refill not needed today Follow-up in 2-3 months Gustabo Elizabeth MD 
2/12/2019 Medications and side effects discussed with patient in detail. With any new medications prescribed, patient was given instructions on administration and side effects. Written medication information was provided to the patient as well. This note was created using voice recognition software. Despite editing, there may be syntax errors. This note will not be viewable in [address]

## 2019-02-12 NOTE — PROGRESS NOTES
Chief Complaint Patient presents with  
 Other Trigeminal neuralgia Visit Vitals /78 Pulse 97 Resp 16 Ht 5' 10\" (1.778 m) Wt 79.4 kg (175 lb) SpO2 98% BMI 25.11 kg/m²

## 2019-02-12 NOTE — LETTER
Jean Pierre Meza is a 37 y.o. male who presents with the following Chief Complaint Patient presents with  
 Other Trigeminal neuralgia HPI Patient comes in with wife and kids for follow up today of facial pain. He saw [employer] [employer] at least visit. He increased his baclofen to 20mg from 10mg. He did get benefit from this adjustment. He had an MRI of the brain that was stable on Jan 30, 2019. 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 The following day from his MRI he had a seizure. He hadn't taken his tegretol dose that AM. He had a history of seizures with impaired consciousness but no GTC. He had CBD oil to take as needed and this stopped him from having a GTC. They started this daily and when he ran out is when he had the GTC. He went back on the CBD oil and has been okay. He is mostly affected by his fatigue. However he prefers this to the pain he was experiencing. We did discuss trigeminal neuralgia and its potential progression. He was taking Tegretol XR 200mg BID. This helped with the trigeminal pain but he had some breakthrough. He had developed some sinus pressure at that time. The did call the office to see if they can adjust meds. He was recommended to take the Tegrerol x 3 tabs. He didn't increase it due to issues with sleepiness with it. He will have head pain when he has the pain in his face. The severe railroad spike pain has improved but he is on the fringe of pain. It is always there but he can function. He has discomfort with touch on this left side. Recap: He was on gabapentin and this made him feel really weird so was switched to 200 mg Tegretol XR BID and this has helped rid of the pins and needles feeling on the left side of his head but he is still having pain along the v1 route of the trigeminal nerve.  He has symptoms in v2-v3 but the dagger, sharp, stabbing pain that comes and goes in the temple area is his biggest concern right now. It will bring him to his knees as it is very painful. Can come for seconds to longer. Sometimes he has had to stay in bed due to pain. He is now at [facility] for his brain tumor and things seem to be stable. Can consider sending to Dr. Silvion Romero if accept insurance to look at trigeminal neuralgia. He also continues with testicular pain but it does not seem to be as prominent as it was at his first visit. He is now established with [facility] for his astrocytoma and has recent MRI normal. Wants to get in with pain management as his pain is big concern. Also on Baclofen 10 mg TID Allergies Allergen Reactions  Fioricet [Butalbital-Acetaminophen-Caff] Unknown (comments)  Prozac [Fluoxetine] Unknown (comments) Current Outpatient Medications Medication Sig  
 ondansetron (ZOFRAN ODT) 4 mg disintegrating tablet Take 1 Tab by mouth every eight (8) hours as needed for Nausea.  carBAMazepine XR (TEGRETOL XR) 200 mg SR tablet Take 1 tab in AM and 1 and 1/2 in PM  
 predniSONE (DELTASONE) 20 mg tablet Take 1 Tab by mouth daily.  baclofen (LIORESAL) 20 mg tablet Take 1 Tab by mouth three (3) times daily.  ALPRAZolam (XANAX) 0.5 mg tablet TAKE 1 TABLET BY MOUTH TWO TIMES DAILY AS NEEDED FOR ANXIETY  pantoprazole (PROTONIX) 40 mg tablet TAKE 1 TABLET BY MOUTH TWO TIMES DAILY  sucralfate (CARAFATE) 1 gram tablet TAKE 1 TABLET BY MOUTH FOUR TIMES DAILY  baclofen (LIORESAL) 10 mg tablet Take 1 Tab by mouth three (3) times daily as needed. No current facility-administered medications for this visit. Social History Tobacco Use Smoking Status Former Smoker  Last attempt to quit: 2013  Years since quittin.4 Smokeless Tobacco Never Used Past Medical History:  
Diagnosis Date  Anxiety  Fatigue  Headache  Joint pain  Leg swelling  Memory loss  Musculoskeletal disorder  Neuropathy Social History Socioeconomic History  Marital status:  Spouse name: Not on file  Number of children: Not on file  Years of education: Not on file  Highest education level: Not on file Tobacco Use  Smoking status: Former Smoker Last attempt to quit: 2013 Years since quittin.4  Smokeless tobacco: Never Used Substance and Sexual Activity  Alcohol use: No  
 
 
Review of Systems Eyes: Negative for blurred vision, double vision and photophobia. Respiratory: Negative for shortness of breath and wheezing. Cardiovascular: Negative for chest pain and palpitations. Gastrointestinal: Negative for nausea and vomiting. Neurological: Positive for sensory change and headaches. Negative for dizziness, tingling, tremors, seizures and loss of consciousness. Psychiatric/Behavioral: Negative for memory loss. The patient is nervous/anxious. Remainder of comprehensive review is negative. Imaging: MRI brain 19: IMPRESSION: 
1. Stable appearance of the signal abnormality centered in the left anteromedial temporal lobe extending into the left frontoparietal white matter. No evidence of tumor progression. Reviewed notes from patient's office visit in October at [facility]. This showed that his tumor was stable. Physical Exam : 
 
Visit Vitals /78 Pulse 97 Resp 16 Ht 5' 10\" (1.778 m) Wt 79.4 kg (175 lb) SpO2 98% BMI 25.11 kg/m² General: Well defined, nourished, and groomed individual in no acute distress.   
Neck: Supple, nontender, no bruits, no pain with resistance to active range of motion.   
Heart: Regular rate and rhythm, no murmurs, rub, or gallop. Normal S1S2. Lungs: Clear to auscultation bilaterally with equal chest expansion, no cough, no wheeze Musculoskeletal: Extremities revealed no edema and had full range of motion of joints.   
Psych: Good mood and bright affect NEUROLOGICAL EXAMINATION:   
 Mental Status: Alert and oriented to person, place, and time Cranial Nerves:   
II, III, IV, VI: Visual acuity grossly intact. Visual fields are normal.   
Pupils are equal, round, and reactive to light and accommodation.   
Extra-ocular movements are full and fluid. Fundoscopic exam was benign, no ptosis or nystagmus.   
V-XII: Hearing is grossly intact. Facial features are symmetric, with normal sensation and strength. The palate rises symmetrically and the tongue protrudes midline. Sternocleidomastoids 5/5. Motor Examination: Normal tone, bulk, and strength, 5/5 muscle strength throughout. Coordination: Finger to nose was normal. No resting or intention tremor Gait and Station: Steady while walking. Normal arm swing. No pronator drift. No muscle wasting or fasiculations noted. Reflexes: DTRs 2+ throughout. This is a 66-year-old gentleman with a history of anaplastic astrocytoma. He presents for follow-up of trigeminal neuralgia. Additionally he has had one generalized tonic-clonic seizure since his last visit. He does have a history of seizures but they are previously controlled with CBD oil. He does note that he was out of the CBD oil when the episode occurred. They have restarted this now. Trigeminal neuralgia is stable but still has some pain. He would like to adjust his Tegretol but is concerned about the potential fatigue effects. Given that he has had a seizure I would prefer her him to remain on a seizure medication so we will continue to try to adjust it. Visit Diagnoses Anaplastic astrocytoma ([facility] [address]    -  Primary Relevant Medications · ondansetron (ZOFRAN ODT) 4 mg disintegrating tablet for nausea associated with Tegretol · carBAMazepine XR (TEGRETOL XR) 200 mg SR tablet · Continue management per [facility] Seizure · carBAMazepine XR (TEGRETOL XR) 200 mg SR tablet · Patient also to continue CBD oil per his [facility] physician.   We did discuss that monitoring of drug levels of his Tegretol will be needed when he is on this since we do not know its potential effect as it is not FDA approved Trigeminal neuralgia of left side of face Relevant Medications · carBAMazepine XR (TEGRETOL XR) 200 mg SR tabletwill increase to 1 tablet in the morning and 1-1/2 tablet in the evening. New prescription written. · Patient has a level ordered to be checked in the next 2 weeks. Advised patient to fax us the level · Continue baclofen 20 mg up to 3 times a day. Refill not needed today Follow-up in 2-3 months Deanna Pace MD 
2/12/2019 Medications and side effects discussed with patient in detail. With any new medications prescribed, patient was given instructions on administration and side effects. Written medication information was provided to the patient as well. This note was created using voice recognition software. Despite editing, there may be syntax errors. This note will not be viewable in [address] Chief Complaint Patient presents with  
 Other Trigeminal neuralgia Visit Vitals /78 Pulse 97 Resp 16 Ht 5' 10\" (1.778 m) Wt 79.4 kg (175 lb) SpO2 98% BMI 25.11 kg/m²

## 2019-02-12 NOTE — PATIENT INSTRUCTIONS
PRESCRIPTION REFILL POLICY [address] [place] [address] Statement to Patients April 1, 2014 In an effort to ensure the large volume of patient prescription refills is processed in the most efficient and expeditious manner, we are asking our patients to assist us by calling your Pharmacy for all prescription refills, this will include also your  Mail Order Pharmacy. The pharmacy will contact our office electronically to continue the refill process. Please do not wait until the last minute to call your pharmacy. We need at least 48 hours (2days) to fill prescriptions. We also encourage you to call your pharmacy before going to  your prescription to make sure it is ready. With regard to controlled substance prescription refill requests (narcotic refills) that need to be picked up at our office, we ask your cooperation by providing us with at least 72 hours (3days) notice that you will need a refill. We will not refill narcotic prescription refill requests after 4:00pm on any weekday, Monday through Thursday, or after 2:00pm on Fridays, or on the weekends. We encourage everyone to explore another way of getting your prescription refill request processed using [employer], our patient web portal through our electronic medical record system. [employer] is an efficient and effective way to communicate your medication request directly to the office and  downloadable as an kojo on your smart phone . [employer] also features a review functionality that allows you to view your medication list as well as leave messages for your physician. Are you ready to get connected? If so please review the attatched instructions or speak to any of our staff to get you set up right away! Thank you so much for your cooperation. Should you have any questions please contact our Practice Administrator. The Physicians and Staff,  [address] [place] [address] Patient Instruction Plan/ Result Policy If we have ordered testing for you, know that; \"NO NEWS IS GOOD NEWS! \" It is our policy that we know longer call patients with results, nor do we  give test results over the phone. We schedule follow up appointments so that your results can be discussed in person. This allows you to address any questions you have regarding the results. If something of concern is revealed on your test, we will contact you to discuss the matter and if needed schedule a sooner follow up appointment. Additionally, results may be found by using the My Chart feature and one of our patient service representatives at the  can give you instructions on how to access this feature to utilize our electronic medical record system. Thank you for your understanding.

## 2019-03-13 ENCOUNTER — TELEPHONE (OUTPATIENT)
Dept: NEUROLOGY | Age: 44
End: 2019-03-13

## 2019-03-13 NOTE — TELEPHONE ENCOUNTER
Pt's wife is requesting an appt as soon as possible per ER recommendation for seizure activity  Best # [phone]

## 2019-03-14 ENCOUNTER — OFFICE VISIT (OUTPATIENT)
Dept: NEUROLOGY | Age: 44
End: 2019-03-14

## 2019-03-14 VITALS
DIASTOLIC BLOOD PRESSURE: 72 MMHG | RESPIRATION RATE: 18 BRPM | OXYGEN SATURATION: 99 % | SYSTOLIC BLOOD PRESSURE: 116 MMHG | WEIGHT: 175 LBS | BODY MASS INDEX: 25.05 KG/M2 | HEIGHT: 70 IN | HEART RATE: 87 BPM | TEMPERATURE: 98.2 F

## 2019-03-14 DIAGNOSIS — R56.9 SEIZURE-LIKE ACTIVITY (HCC): Primary | ICD-10-CM

## 2019-03-14 RX ORDER — NITROFURANTOIN 25; 75 MG/1; MG/1
CAPSULE ORAL
Refills: 0 | COMMUNITY
Start: 2019-03-07 | End: 2019-03-14 | Stop reason: ALTCHOICE

## 2019-03-14 RX ORDER — VALACYCLOVIR HYDROCHLORIDE 500 MG/1
TABLET, FILM COATED ORAL
COMMUNITY
Start: 2018-10-11

## 2019-03-14 RX ORDER — PROMETHAZINE HYDROCHLORIDE 12.5 MG/1
TABLET ORAL
Refills: 0 | COMMUNITY
Start: 2019-03-07

## 2019-03-14 NOTE — PROGRESS NOTES
Chief Complaint   Patient presents with    Seizure     f/u  taken to ED at [facility] on Sunday and had EEG there showing multiple activity and recommended 24 hr EEG, Pt's wife on phone trying to get report faxed here now. percocet seemed to affect seizure activity and once he stopped taking, has not had another episode       Coordination of Care Questions    1. Have you been to the ER, urgent care clinic outside of [address] [place] [address] since your last visit? No       Hospitalized since your last visit? No    2. Have you seen or consulted any other health care providers outside of the [address] [place] [address] since your last visit? Include any pap smears or colon screening.  No

## 2019-03-14 NOTE — PROGRESS NOTES
[facility] and [address] at [facility] at [facility] [address], [address] [facility] [address] [facility], [address] [place] [address]   [phone]              Chief Complaint   Patient presents with    Seizure     f/u  taken to ED at [address] [place] [address] on Sunday and had EEG there showing multiple activity and recommended 24 hr EEG, Pt's wife on phone trying to get report faxed here now. percocet seemed to affect seizure activity and once he stopped taking, has not had another episode     Current Outpatient Medications   Medication Sig Dispense Refill    valACYclovir (VALTREX) 500 mg tablet TAKE 1 TABLET BY MOUTH TWO TIMES DAILY      ondansetron (ZOFRAN ODT) 4 mg disintegrating tablet Take 1 Tab by mouth every eight (8) hours as needed for Nausea. 20 Tab 5    carBAMazepine XR (TEGRETOL XR) 200 mg SR tablet Take 1 tab in AM and 1 and 1/2 in PM (Patient taking differently: Take 300 mg by mouth two (2) times a day. Take 1 tab in AM and 1 and 1/2 in PM) 90 Tab 5    predniSONE (DELTASONE) 20 mg tablet Take 1 Tab by mouth daily. 2    baclofen (LIORESAL) 20 mg tablet Take 1 Tab by mouth three (3) times daily. 90 Tab 5    ALPRAZolam (XANAX) 0.5 mg tablet TAKE 1 TABLET BY MOUTH TWO TIMES DAILY AS NEEDED FOR ANXIETY 60 Tab 0    pantoprazole (PROTONIX) 40 mg tablet TAKE 1 TABLET BY MOUTH TWO TIMES DAILY  2    sucralfate (CARAFATE) 1 gram tablet TAKE 1 TABLET BY MOUTH FOUR TIMES DAILY  3    promethazine (PHENERGAN) 12.5 mg tablet TAKE 1 TABLET BY MOUTH EVERY 4 HOURS  0    baclofen (LIORESAL) 10 mg tablet Take 1 Tab by mouth three (3) times daily as needed.  (Patient not taking: Reported on 3/14/2019) 90 Tab 5      Allergies   Allergen Reactions    Fioricet [Butalbital-Acetaminophen-Caff] Unknown (comments)    Prozac [Fluoxetine] Unknown (comments)     Social History     Tobacco Use    Smoking status: Former Smoker     Last attempt to quit: 2013     Years since quittin.4    Smokeless tobacco: Never Used   Substance Use Topics    Alcohol use: No    Drug use: Not on file   Patient comes today with his wife in a work in appointment today. His wife called the office for an emergent follow-up because he had been hospitalized at [facility] after having had a seizure. He is a patient of Dr. Michele Parada she is not in the office today so we worked him into the schedule. His wife provides the majority of the history because he is quite tangential.  We do not have records from [address] [place] [address].  His wife has some discharge paperwork. In any regard as best I understand that the patient underwent rotator cuff repair at [facility] and he was given some pain medication of narcotic type and he was taking this at home and he says that he felt disconnected and disjointed and was having sweats. His wife was concerned because of this and she says that he would stare at times and she thought that maybe he was going to have a seizure because of the way he looked. She was not certain that he had a seizure but thought that he might have a seizure. She also notes that he had prostatitis at the same time and was on some antibiotic. She talked to the physician who did the surgery on the shoulder because she was concerned that he might be septic because he had a fever and he was confused. It was agreed after that discussion that she is taken to the emergency room for suspicion of sepsis. They took him to [facility] where he was evaluated. He had a head CT that showed changes consistent with his known astrocytoma. He was admitted to the neurology floor and he had an EEG that the wife says demonstrated seizure activity.   He was told that he needed to be discharged from the hospital and get a 24-hour EEG to make sure that he did not have nonconvulsive status epilepticus and is really interesting in that the discharge paperwork he has has a whole    Talking about how the patient could potentially be having nonconvulsive status epilepticus and that his seizure medicines needed to be changed but then there was another section detailing how Dr. Lucille Guerrero saw him in consultation and felt that he was appropriate for discharge. What documentation they had really did not make much sense. Additionally the patient goes on a tangent discussing how he believes he was having convulsions over and over and over and over again prior to going to the hospital and even during the hospital but what he is describing is not consistent with a convulsion. His wife describes nothing that sounds like a generalized tonic-clonic seizure. What she is describing is not consistent with a partial seizure either. The patient tells me that he has not had any further events because he is not taking the narcotics anymore. He also says that he is on the CBD oil. I asked him where he is getting the CBD oil and he tells me from the [employer] store and that it is \"[employer]\" and I should know what that is. He is unable to tell me how he knows what is actually in the CBD oil he is getting from the shelf in the [employer] store. Review of the care everywhere portion of the electronic medical record finds that calls were made to the [facility] on 12 March to their neurology department where they were advised to increase his Tegretol to a dose of 300 mg twice daily secondary to the report of seizure and report of Tegretol level of 4.5. Notes indicate that his wife was reluctant to do that because they are using \"CBD oil\". Examination  Visit Vitals  /72 (BP 1 Location: Right arm, BP Patient Position: Sitting)   Pulse 87   Temp 98.2 °F (36.8 °C) (Oral)   Resp 18   Ht 5' 10\" (1.778 m)   Wt 79.4 kg (175 lb)   SpO2 99%   BMI 25.11 kg/m²   He is appropriately dressed and groomed. No icterus. He has a sling on the left upper extremity. He is irritable agitated and aggressive. He is argumentative. He is fluent. He ambulates steadily. Full examination is not performed secondary to his argumentative nature and inability to wish to engage in any conversation except for one that relates to his believes. Impression/Plan  42-year-old gentleman with astrocytoma left temporal and history of seizure-like activity and some episode of confusion leading to hospitalization and what said to be an abnormal EEG with some contradictory statements in the discharge paperwork and an interesting adamant demand by the patient and his wife that they have a 24-hour ambulatory EEG which I do not doubt they were told they needed by the discharging people at [facility]. I just do not understand exactly what is felt to be the data to be captured on the 24-hour ambulatory EEG if the concern is for nonconvulsive status epilepticus. Certainly an ambulatory EEG is not going to be sufficient to look for subtle changes of nonconvulsive seizure. Certainly there is no time locked video on that. I tried to tease out exactly what behaviors were occurring that caused concern and alarm in both the patient and his wife for seizure and when doing so the patient became erratically angry and began to become quite argumentative. He has increased his Tegretol to a dose of 300 mg twice daily as advised by the people at the [facility] Sabiha Tirpathi. My suggestion to the patient and his wife is that if one is truly concerned about subclinical events then we actually need to have him in the epilepsy monitoring unit where we can have him on continuous EEG with simultaneous video monitoring and can correlate any type of behavioral abnormalities with the EEG.   That is why I find this very interesting in that part of his discharge paperwork is discussing the fact that there is some overwhelming concern that the patient is having her was having at that time nonconvulsive status epilepticus however [facility] has an epilepsy monitoring unit and has the capability for continuous EEG but he was not, at least according to the limited paperwork I have, and at least according to the history given by the patient and his wife, he was not monitored. In that regard to have him discharged to have a 24-hour ambulatory EEG again does not make much sense. So in any regard my advice to him was to continue the same dose of Tegretol as he is. But I recommended that he undergo continuous EEG with simultaneous video monitoring in the epilepsy monitoring unit. At this point he was fairly belligerent. I left him and his wife to discuss my recommendation. After I left the room there was significant slamming of items in the room and significant noise. The practice administrator and the practice clinical supervisor entered the room and I am told the patient and his wife decided to leave without incident. I take that as he decided not to follow my suggestion for EMU monitoring. He does have established care at the [address] [place] [address] and it would make much more sense for him to follow there. Given his behavior here in the office today I will ask our practice administrator to send a formal letter of termination of provider relationship. Total time: 30 min   Counseling / coordination time: 20 min   > 50% counseling / coordination?: Yes re: as documented above      Dat Flood MD      This note was created using voice recognition software. Despite editing, there may be syntax errors. This note will not be viewable in [address]

## 2019-05-06 RX ORDER — BACLOFEN 20 MG/1
TABLET ORAL
Qty: 90 TAB | Refills: 5 | Status: SHIPPED | OUTPATIENT
Start: 2019-05-06 | End: 2019-10-29 | Stop reason: SDUPTHER

## 2019-10-16 ENCOUNTER — TELEPHONE (OUTPATIENT)
Dept: NEUROLOGY | Age: 44
End: 2019-10-16

## 2019-10-16 NOTE — TELEPHONE ENCOUNTER
Letter of dismissal from practice generated on 3/15/19.   Called Gale Leventhal to notified her that pt is no longer active here and she will have to contact pt for further information

## 2019-10-16 NOTE — TELEPHONE ENCOUNTER
Porsha Edmonds NP from [facility], calling to confirm diagnosis of \"Taco cytoma\" and who was oncologist that made this diagnosis. Would like a call from Dr. Sandeep Lucio or nurse at earliest convenience.       [phone]

## 2019-10-29 RX ORDER — BACLOFEN 20 MG/1
TABLET ORAL
Qty: 90 TAB | Refills: 5 | Status: SHIPPED | OUTPATIENT
Start: 2019-10-29 | End: 2020-05-20

## 2020-05-20 RX ORDER — BACLOFEN 20 MG/1
TABLET ORAL
Qty: 90 TAB | Refills: 5 | Status: SHIPPED | OUTPATIENT
Start: 2020-05-20 | End: 2020-11-16

## 2020-11-16 RX ORDER — BACLOFEN 20 MG/1
TABLET ORAL
Qty: 90 TAB | Refills: 5 | Status: SHIPPED | OUTPATIENT
Start: 2020-11-16 | End: 2021-05-12

## 2021-03-02 ENCOUNTER — TRANSCRIBE ORDER (OUTPATIENT)
Dept: SCHEDULING | Age: 46
End: 2021-03-02

## 2021-03-02 DIAGNOSIS — M75.101 ROTATOR CUFF SYNDROME OF RIGHT SHOULDER: Primary | ICD-10-CM

## 2021-03-09 ENCOUNTER — HOSPITAL ENCOUNTER (OUTPATIENT)
Dept: MRI IMAGING | Age: 46
Discharge: HOME | End: 2021-03-09
Attending: ORTHOPAEDIC SURGERY
Payer: MEDICARE

## 2021-03-09 DIAGNOSIS — M75.101 ROTATOR CUFF SYNDROME OF RIGHT SHOULDER: ICD-10-CM

## 2021-03-09 PROCEDURE — 73221 MRI JOINT UPR EXTREM W/O DYE: CPT

## 2021-03-10 ENCOUNTER — HOSPITAL ENCOUNTER (OUTPATIENT)
Dept: PHYSICAL THERAPY | Age: 46
Discharge: HOME | End: 2021-03-10
Payer: MEDICARE

## 2021-03-10 PROCEDURE — 97162 PT EVAL MOD COMPLEX 30 MIN: CPT

## 2021-03-10 PROCEDURE — 97140 MANUAL THERAPY 1/> REGIONS: CPT

## 2021-03-10 NOTE — PROGRESS NOTES
[facility]  Lymphedema Clinic  [address] [place] [address], [address], [facility] 22.    INITIAL EVALUATION    NAME: Barbara Green AGE: 55 y.o. GENDER: male  DATE: 3/10/2021  REFERRING PHYSICIAN: Dr. Demi Sprague  HISTORY AND BACKGROUND:   Primary Diagnosis:  · Lymphedema, not elsewhere classified (B LE) (I89.0)  Other Treatment Diagnoses:   Abnormality of gait (R26.9)   Swelling not relieved by elevation (R60.9)   Pain in the R testicle (N50.811)   Pain in the L testicle (N73.946)   Pain in the L leg (M79.605)  Date of Onset: 1/1/2020  Present Symptoms and Functional Limitations: Patient reports that he first noticed swelling in the legs 1 to 2 years ago. The swelling is intermittent and may have started soon after having chemotherapy and radiation treatments for an inoperable brain tumor. He self purchased knee high stockings but the swelling appeared worse when garments were worn. He also reports pain in the L > R leg, testicles, and L arm which is chronic but overwhelming to patient. He has been referred to Endocrinology, Orthopedics, Podiatry, Urology, and Vascular Surgery and testing was unremarkable according to patient and wife. He is followed by a Pain Specialist and the pain has improved but continues to limit his ability to interact with his family. Patient was referred to our clinic for evaluation, treatment, and manual lymphatic drainage. [facility] Lymphedema Assessment Scale: 12/20. Past Medical History:   Past Medical History:   Diagnosis Date    Anxiety     Fatigue     Headache     Joint pain     Leg swelling     Memory loss     Musculoskeletal disorder     Neuropathy    No past surgical history on file.   Current Medications:    Current Outpatient Medications   Medication Sig    baclofen (LIORESAL) 20 mg tablet TAKE 1 TABLET BY MOUTH THREE TIMES DAILY    promethazine (PHENERGAN) 12.5 mg tablet TAKE 1 TABLET BY MOUTH EVERY 4 HOURS    valACYclovir (VALTREX) 500 mg tablet TAKE 1 TABLET BY MOUTH TWO TIMES DAILY    ondansetron (ZOFRAN ODT) 4 mg disintegrating tablet Take 1 Tab by mouth every eight (8) hours as needed for Nausea.  carBAMazepine XR (TEGRETOL XR) 200 mg SR tablet Take 1 tab in AM and 1 and 1/2 in PM (Patient taking differently: Take 300 mg by mouth two (2) times a day. Take 1 tab in AM and 1 and 1/2 in PM)    predniSONE (DELTASONE) 20 mg tablet Take 1 Tab by mouth daily.  baclofen (LIORESAL) 10 mg tablet Take 1 Tab by mouth three (3) times daily as needed. (Patient not taking: Reported on 3/14/2019)    ALPRAZolam (XANAX) 0.5 mg tablet TAKE 1 TABLET BY MOUTH TWO TIMES DAILY AS NEEDED FOR ANXIETY    pantoprazole (PROTONIX) 40 mg tablet TAKE 1 TABLET BY MOUTH TWO TIMES DAILY    sucralfate (CARAFATE) 1 gram tablet TAKE 1 TABLET BY MOUTH FOUR TIMES DAILY     No current facility-administered medications for this encounter. Allergies: Allergies   Allergen Reactions    Fioricet [Butalbital-Acetaminophen-Caff] Unknown (comments)    Prozac [Fluoxetine] Unknown (comments)        Prior Level of Function/Social/Work History/Personal factors and/or comorbidities impacting plan of care: Patient does not work. Living Situation: Patient lives with his wife and two young children in a single story house. Trainable Caregiver?: Yes  Mobility: Independent gait without any assistive device for community distances  Sleeping Arrangement:  in bed  Adaptive Equipment Owned: Not assessed    Previous Therapy:  None  Compression/Lymphedema Equipment: patient has self purchased a pair of knee high compression stockings. SUBJECTIVE:   Patient arrives to the visit with his wife. He reports swelling in the legs with the L leg worse than the R leg. The swelling is intermittent and has recently improved. Patient voices frustration with chronic pain issues, especially in the testicles and lower legs.  Patient has seen multiple specialists but nobody can determine why the pain is present. Patient's goals for therapy: eliminate pain and engage with family. OBJECTIVE DATA SUMMARY:   EXAMINATION/PRESENTATION/DECISION MAKING:   Pain:   Patient reports pain is 3/10 in the testicles and lower legs. Relieved with a hot bath. Self-Care and ADLs: Independent with most but limited by pain and fatigue at times. Skin and Tissue Assessment:  Dermal Status: Intact    Texture/Consistency: No apparent swelling in the legs or feet noted during the visit today. Pigmentation/Color Change: Normal    Anomalies: N/A    Circulatory: Patient reports that he has been cleared by Vascular Surgery. Nails: Normal    Stemmers Sign: Negative    Height:   5'9\"  Weight:   210.0 lb per patient   BMI:   29.5  (36 or greater: adversely affecting lymphedema)  Wound/Ulcer:  N/A        Volumetric Measurements:   Right:  8,391.24 mL Left:  8,500.56 mL   % Difference: 1% Dominance: R   Range of Motion: within functional limits  Strength: Not formally assessed 2* patient is able to complete all functional activities in the clinic today. Sensation:  Impaired hypersensitive to touch on the lower legs    Mobility:    Bed/Chair Mobility: Independent  Transfers: Independent  Gait: Independent  Endurance: Fair - due to fatigue     Safety:  Patient is alert and oriented: Yes  Safety awareness: Appears intact   Fall risk?: Yes - patient reports no recent falls. Patient given written fall prevention handout: Yes     Based on the above components, the patient evaluation is determined to be of the following complexity level: MEDIUM    Evaluation Time: 1:30-2:05 pm    TREATMENT PROVIDED:   1. Treatment description:  Manual Therapy:  Patient reports that swelling in the legs began soon after chemotherapy and radiation treatments but has improved since then.   Full LE volumetric measurements taken today reveal a percentage difference of 1% L LE > R LE with no apparent swelling visualized in the legs and feet during the visit today. Patient self purchased knee high stockings in the past but the stockings seem to make the swelling worse and were difficult to tolerate due to pain and sensitivity in the legs. Patient reports chronic pain in the testicles which limits his ability to tolerate activity and interact with family. Patient reports no apparent swelling in the genitals at this time. Patient wears a jock strap during the day and frequently at night. Provided patient with information on a male genital pad with straps to wear daily and/or nightly for increased comfort and support. Patient may be interested in obtaining a genital pad in the future. Will attempt to secure a vendor and check insurance benefits for garment prior to his next visit. Patient reports that taking a hot bath will relieve pain in the legs and genitals. Discussed the effect of heat on swelling and the importance of monitoring for any increase in swelling of the legs and genitals. Discussed the role and benefit of manual lymphatic drainage for management of swelling and for an analgesic effect. Patient and wife were educated in the self MLD packet with modifications during the visit today. Wife was educated in stationary hand circles and hand pump techniques during self MLD and was able to demonstrate the technique with cues from therapist and feedback from . Provided patient with written instructions to follow and patient's wife will assist with daily MLD at home. MLD will be deferred in the home setting with any increase in pain. Provided patient with contact information for a massage therapist in the area that specialize in maintenance manual lymphatic drainage and suggested that patient discuss the possibility of Acupuncture for relief of pain with his Pain Specialist.   Discussed the importance of participating in an exercise program and/or staying active within tolerance. Discussed the importance of daily exercise in management of swelling and encouraged patient to ambulate up to 25 minutes per day. Patient was educated in deep abdominal breathing techniques to be performed with MLD and throughout the day as tolerated. Treatment time:  2:05-3:10 pm  Minutes: 72  ASSESSMENT:   Cipriano Proctor is a 55 y.o. male who presents with history of intermittent LE swelling following diagnosis and treatment for Anaplastic Astrocytoma. His condition is complicated by chronic pain and sensitivity in the legs and testicles and occasionally the L arm. Patient voices frustration with the amount of medication required for pain relief and the effect the pain is having on the interaction with his family. Full LE volumetric measurements taken today reveal a percentage difference of 1% with little to no apparent LE swelling noted during the visit today. Patient may benefit from compression garments to provide support, comfort, and pain relief for the genitals and for the legs due to history of swelling. Patient and wife voiced  interest in further education in manual lymphatic drainage techniques during a future visit. This care is medically necessary due to the infection risk with lymphedema and to improve functional activities. CDT is necessary to resolve swelling to allow patient to return to wearing normal clothes/footwear and prevent worsening of symptoms, such as infections and/or hospitalizations. Patient will be independent with home program strategies to allow improved ADL ability and mobility and to allow patient to return to greatest functional independence. Rehabilitation potential is considered to be Fair. Factors which may influence rehabilitation potential include pain, inoperable brain tumor, and good family support. Patient will benefit from 2 to 4 physical therapy visits over 12 weeks to optimize improvement in these areas.     PLAN OF CARE:   Recommendations and Planned Interventions: Manual lymph drainage/decongestive therapy, Compression garment fitting/provision, Lymphedema therapeutic exercise, Education in skin care and lymphedema precautions, Self-MLD education per home program and Caregiver education as needed    GOALS  Short term goals  Time frame: to be met by 4/14/2021  1. Patient will demonstrate knowledge of signs/symptoms of infections/cellulitis and be independent in skin care to prevent cellulitis. 2.  Patient will demonstrate independence in lymphedema home program of therapeutic exercises and deep abdominal breathing to improve circulation and decongest limb to improve ADLs. 3.  Patient will participate in the selection process to allow ordering of home compression system (daytime, nighttime garments and pump as needed). Long term goals  Time frame: 6/5/2021. 1.  Pt will show stable limb volumes showing pt. ready for transition to independent restorative phase of lymphedema therapy. 2.  Patient and/or wife will demonstrate independence with manual lymphatic drainage techniques for management of swelling and pain relief. Patient has participated in goal setting and agrees to work toward plan of care. Patient was instructed to call if questions or concerns arise. Thank you for this referral.  Chase Bañuelos, PT, CLT   Time Calculation: 100 mins    TREATMENT PLAN EFFECTIVE DATES:   3/10/2021 TO 6/5/2021  I have read the above plan of care for [place]. I certify the above prescribed services are required by this patient and are medically necessary.   The above plan of care has been developed in conjunction with the lymphedema/physical therapist.       Physician Signature: ____________________________________Date:______________     Dr. Earlene Pardo

## 2021-03-16 ENCOUNTER — APPOINTMENT (OUTPATIENT)
Dept: PHYSICAL THERAPY | Age: 46
End: 2021-03-16
Payer: MEDICARE

## 2021-04-23 ENCOUNTER — HOSPITAL ENCOUNTER (OUTPATIENT)
Dept: PHYSICAL THERAPY | Age: 46
Discharge: HOME | End: 2021-04-23
Payer: MEDICARE

## 2021-04-23 PROCEDURE — 97140 MANUAL THERAPY 1/> REGIONS: CPT

## 2021-04-23 PROCEDURE — 97110 THERAPEUTIC EXERCISES: CPT

## 2021-04-23 NOTE — PROGRESS NOTES
LYMPHEDEMA 30 DAY TREATMENT NOTE - [facility] 2-15    Patient Name: Meghann Gorman  Date:2021  : 1975  [x]  Patient  Verified  [place] / Plan: [place] PART A & B / Product Type: Medicare /    In time:12:10 pm  Out time:13:30 pm  Total Treatment Time (min): 80  Total Timed Codes (min): 80  1:1 Treatment Time ( [address] only): 80   Visit #:  2    Treatment Area: Lymphedema, not elsewhere classified [I89.0]    SUBJECTIVE  Pain Level (0-10 scale): 3/10 in the L leg and groin   Any medication changes, allergies to medications, adverse drug reactions, diagnosis change, or new procedure performed?: [x] No    [] Yes (see summary sheet for update)  Subjective functional status/changes:   [x] No changes reported    Patient reports that he was feeling pretty well until recently when he stood on a ladder for several hours painting the trim on windows. He noticed an increase in L leg swelling and pain in the leg and testicles soon after completing the project. OBJECTIVE    10 min Therapeutic Exercise:  [] Rozena English Exercises [x] Remedial Lymphedema Exercise Program - continued education in the role and benefit of the muscle pump function in management of swelling. Discussed the role and benefit of compression garments with exercise. Discussed initiating a strengthening exercise program slowly and limiting the amount of weight and repetitions and to monitor for any increase in swelling with progression of the exercise program.  [] Axillary Web Exercise Program      [] Shoulder ROM Exercises   Rationale: Activate muscle pump to improve lymphatic fluid movement and decrease swelling to improve the patients ability to perform ADL and IADL skills and prevent worsening of swelling over time. 70 min Manual Therapy    Manual Lymphatic Drainage (MLD):  Area to decongest: L LE and trunk   Sequence used and effectiveness: Modifications were made to manual lymph drainage sequence to exclude cervical techniques. Secondary sequence for lower extremities with trunk involvement. Continued education in self MLD packet with patient and wife. Wife is assisting with MLD at home. Skin/wound care/debridement: Reviewed skin care principles:   Skin is intact. Continued education in self MLD with bathing and skin care. Upper/Lower Extremity Compression: Measured and fitted patient with the following compression products:    L LE:   Style: Circular knit    Brand: [employer] knee high 20-30 mmHg    Type: Non-Custom: Size: large    Education: Educated patient in compression garment donning and doffing. Glove use with donning. Daily wear schedule. Daily laundering. Garment lifespan. Educated patient to monitor for redness or pressure points on the skin. Patient/family demonstrated donning and doffing with independence     Rationale: decrease pain and decrease edema  to improve the patients ability to perform mobility and functional activities with less pain and difficulty. With   [x] TE   [] TA   [x] MT   [] SC   [] other: Patient Education: [x] Review HEP    [x] MLD Patient Education Reviewed with patient, as well as demonstration and instruction during MLD portion of the session. Continued education in self MLD technique with bathing and skin care. Provided patient with the written instructions to follow. [x] Progressed/Changed HEP based on: exercise progression and compression garments with exercise. [] positioning   [] Kinesiotape   [] Skin care   [] wound care   [] other:   Patient / caregiver re-demonstrated bandaging. [] Yes  [] No  Compression bandaging/garment precautions reviewed: [] Yes  [] No     Other Objective/Functional Measures: LE Volumes     L LE volumes taken today 8,804.69 ml today compared to 8,500.56 ml on 3/10/21  R LE 8,707. 67ml today compared to 8,391.24 ml on 3/10/21  Percent difference today is 1% as compared to 1% on evaluation.     Pain Level (0-10 scale) post treatment: 3/10      ASSESSMENT: Patient is returning to the clinic today for the first visit since evaluation on 3/10/21. The pain in the L leg and groin was fairly well controlled until recently after performing household chores at home. The swelling in the L leg continues to be intermittent and will normally improve at night. Patient's wife is supportive and assisting with manual lymphatic drainage at home. She had questions on proper MLD techniques and those were answered today. Patient will work with the donated knee highs and continue with MLD and return to the clinic in approximately one month so the effectiveness of his home program can be assessed. Patient will continue to benefit from skilled PT services to  assess for additional garment needs, manual lymphatic drainage, address swelling and analyze compression product fit and use to attain remaining goals. [x]  See Plan of Care  []  See progress note/recertification  []  See Discharge Summary         Progress towards goals / Updated goals:  Short term goals  Time frame: to be met by 4/14/2021  1. Patient will demonstrate knowledge of signs/symptoms of infections/cellulitis and be independent in skin care to prevent cellulitis. Goal in progress. 2.  Patient will demonstrate independence in lymphedema home program of therapeutic exercises and deep abdominal breathing to improve circulation and decongest limb to improve ADLs. Goal in progress. 3.  Patient will participate in the selection process to allow ordering of home compression system (daytime, nighttime garments and pump as needed). Donated patient one pair of knee highs today. Goal in progress.      Long term goals  Time frame: 6/5/2021. 1.  Pt will show stable limb volumes showing pt. ready for transition to independent restorative phase of lymphedema therapy. LE volumes increased bilaterally. Patient reports a recent weight gain. Percentage difference remains low at 1%. Goal in progress. 2.  Patient and/or wife will demonstrate independence with manual lymphatic drainage techniques for management of swelling and pain relief. Goal in progress.      PLAN  [x]  Upgrade activities as tolerated     [x]  Continue plan of care  []  Update interventions per flow sheet       []  Discharge due to:_  []  Other:_      Emanuel Fabian, PT, CLT 4/23/2021

## 2021-05-12 RX ORDER — BACLOFEN 20 MG/1
TABLET ORAL
Qty: 90 TAB | Refills: 5 | Status: SHIPPED | OUTPATIENT
Start: 2021-05-12 | End: 2021-11-05

## 2021-06-01 ENCOUNTER — HOSPITAL ENCOUNTER (OUTPATIENT)
Dept: PHYSICAL THERAPY | Age: 46
Discharge: HOME | End: 2021-06-01
Payer: MEDICARE

## 2021-06-01 PROCEDURE — 97140 MANUAL THERAPY 1/> REGIONS: CPT

## 2021-06-01 NOTE — PROGRESS NOTES
LYMPHEDEMA 90 PROGRESS NOTE with Updated Plan of Care- [facility] 2-15    Patient Name: Di Lopez  Date:2021  : 1975  [x]  Patient  Verified  [place]or[place] / Plan: [place] MED[place] PART [place] & B / Product Type: Medicare /    In time:12:10 pm  Out time:13:30 pm  Total Treatment Time (min): 80  Total Timed Codes (min): 80  1:1 Treatment Time ( [address] only): 80   Visit #:  3    Treatment Area: Lymphedema, not elsewhere classified [I89.0]    SUBJECTIVE  Pain Level (0-10 scale): 3/10 in the L leg and groin   Any medication changes, allergies to medications, adverse drug reactions, diagnosis change, or new procedure performed?: [x] No    [] Yes (see summary sheet for update)  Subjective functional status/changes:   [x] No changes reported    Patient reports wearing his donated pair of knee highs for a few hours each day. The swelling and pain in the lower leg will improve when the knee highs are worn but the swelling and pain in the upper leg and groin will get worse. OBJECTIVE    0 min Therapeutic Exercise:  [] [employer] [place] Exercises [x] Remedial Lymphedema Exercise Program - continued education in the role and benefit of the muscle pump function in management of swelling. Discussed the role and benefit of compression garments with exercise. Discussed initiating a strengthening exercise program slowly and limiting the amount of weight and repetitions and to monitor for any increase in swelling with progression of the exercise program.  [] Axillary Web Exercise Program      [] Shoulder ROM Exercises   Rationale: Activate muscle pump to improve lymphatic fluid movement and decrease swelling to improve the patients ability to perform ADL and IADL skills and prevent worsening of swelling over time.     90 min Manual Therapy    Manual Lymphatic Drainage (MLD):  Area to decongest: L LE and trunk   Sequence used and effectiveness: Modifications were made to manual lymph drainage sequence to exclude cervical techniques. Secondary sequence for lower extremities with trunk involvement. Continued education in self MLD packet and deep abdominal breathing techniques with patient and wife. Wife is assisting with MLD at home. Skin/wound care/debridement: Reviewed skin care principles:   Skin is intact. Continued education in self MLD with bathing and skin care. Upper/Lower Extremity Compression: Donated patient: one pair of Jobst Relief knee highs 20-30 mmHg in the size large last visit. Patient wears the knee highs at least a few hours each day. Patient reports that swelling and pain in the lower leg improves when the knee highs are worn and is interested in exploring other compression options for management of swelling and pain in the upper leg and trunk. Provided patient with samples of compression products today and he voiced interest in obtaining one pair of thigh highs and one pair of compression bike shorts. Measured patient for:     Style: Circular knit    Brand: Juzo Soft thigh highs 20-30 mmHg    Type: Non-Custom: Size: 3 regular length    Brand: One pair of Sigvaris Compreshorts     Type: Non-custom: Size X large     The garment order will be submitted to the vendor, [employer], for processing. Patient will bring the garments to the clinic for processing. Education: Educated patient in compression garment donning and doffing. Glove use with donning. Daily wear schedule. Daily laundering. Garment lifespan. Educated patient to monitor for redness or pressure points on the skin. Patient/family demonstrated donning and doffing with independence     Vaso-pneumatic Device: Discussed the role and benefit of the vaso-pneumatic device for management of swelling of the L LE and patient voiced interest in having a pump trial during a future visit.  Due to patient's cancer status, the lymphedema has the potential to significantly worsen over time if it is not managed well and patient tends to experience an analgesic effect with manual lymphatic drainage. Will contact the vendor, [employer], to check insurance benefits. Rationale: decrease pain and decrease edema  to improve the patients ability to perform mobility and functional activities with less pain and difficulty. With   [] TE   [] TA   [x] MT   [] SC   [] other: Patient Education: [x] Review HEP    [x] MLD Patient Education Reviewed with patient, as well as demonstration and instruction during MLD portion of the session. Continued education in self MLD technique with bathing and skin care. Provided patient with the written instructions to follow. [x] Progressed/Changed HEP based on: exercise progression and compression garments with exercise. [] positioning   [] Kinesiotape   [] Skin care   [] wound care   [x] other: compression garment options  Patient / caregiver re-demonstrated bandaging. [] Yes  [] No  Compression bandaging/garment precautions reviewed: [x] Yes  [] No     Other Objective/Functional Measures: LE Volumes     L LE volumes taken today 8,730.26 ml compared to 8,500.56 ml on 3/10/21  R LE volumes taken today 8,435.48 ml compared to 8,391.24 ml on 3/10/21  Percent difference today is 3.50% L LE > R LE as compared to 1% on evaluation. Pain Level (0-10 scale) post treatment: 3/10      ASSESSMENT: Patient was last seen in the clinic 4/23/21 and has been working on his home program with assistance from his wife. Pain and swelling in the L lower leg have improved with MLD and compression products and patient is now interested in obtaining compression products for the upper leg and trunk. Patient would benefit from having a home vaso-pneumatic device to increase patient's ability to  manage his condition independently at home.  Patient's plan of care will need to be extended to 8/27/21 to allow patient to obtain additional garments and a pump if covered by insurance in preparation for discharge to the restorative phase of care. Patient will continue to benefit from skilled PT services to  assess for additional garment needs, manual lymphatic drainage, address swelling and analyze compression product fit and use to attain remaining goals. [x]  Update Plan of Care  []  See progress note/recertification  []  See Discharge Summary         Progress towards goals / Updated goals:  Short term goals  Time frame: to be met by 4/14/2021, all goals will be extended to be met by 8/27/21  1. Patient will demonstrate knowledge of signs/symptoms of infections/cellulitis and be independent in skin care to prevent cellulitis. Goal in progress. 2.  Patient will demonstrate independence in lymphedema home program of therapeutic exercises and deep abdominal breathing to improve circulation and decongest limb to improve ADLs. Goal in progress. 3.  Patient will participate in the selection process to allow ordering of home compression system (daytime, nighttime garments and pump as needed). Donated patient one pair of knee highs and compression garments ordered today for management of swelling and pain in the upper leg and trunk. Patient is interested in having a pump trial during a future visit. Will contact the vendor to check insurance benefits. . Goal in progress.      Long term goals  Time frame: 6/5/2021, all goals will be extended to be met by 8/27/21. 1.  Pt will show stable limb volumes showing pt. ready for transition to independent restorative phase of lymphedema therapy. LE volumes increased bilaterally. Percentage difference increased from 1% to 3.50% L LE > R LE since evaluation 3/10/21. Goal in progress. 2.  Patient and/or wife will demonstrate independence with manual lymphatic drainage techniques for management of swelling and pain relief. Goal in progress.      PLAN  [x]  Upgrade activities as tolerated     [x]  Extend plan of care  []  Update interventions per flow sheet       []  Discharge due to:_  [] Other:_      Emanuel Fabian PT, CLT 6/1/2021   TREATMENT PLAN EFFECTIVE DATES:   6/1/2021 TO 8/27/2021  I have read the above plan of care for Rima Turk. I certify the above prescribed services are required by this patient and are medically necessary.   The above plan of care has been developed in conjunction with the lymphedema/physical therapist.   Physician Signature: ____________________________________________________     Dr. Shantelle Horton    Date: _________________________________________________________________

## 2021-07-13 ENCOUNTER — APPOINTMENT (OUTPATIENT)
Dept: PHYSICAL THERAPY | Age: 46
End: 2021-07-13
Payer: MEDICARE

## 2021-07-28 ENCOUNTER — HOSPITAL ENCOUNTER (OUTPATIENT)
Dept: PHYSICAL THERAPY | Age: 46
Discharge: HOME | End: 2021-07-28
Payer: MEDICARE

## 2021-07-28 PROCEDURE — 97140 MANUAL THERAPY 1/> REGIONS: CPT

## 2021-07-28 PROCEDURE — 97016 VASOPNEUMATIC DEVICE THERAPY: CPT

## 2021-07-28 NOTE — PROGRESS NOTES
LYMPHEDEMA DISCHARGE NOTE - [facility] 2-15    Patient Name: Jorge Rodarte  Date:2021  : 1975  [x]  Patient  Verified  Payor: [place] MEDICARE / Plan: [place] MEDICARE PART A & B / Product Type: Medicare /    In time: 2:00 pm  Out time: 3:30 pm  Total Treatment Time (min): 90  Total Timed Codes (min): 90  1:1 Treatment Time ( [address] only): 90   Visit #: 4  Treatment Area: Lymphedema, not elsewhere classified [I89.0]    SUBJECTIVE  Pain Level (0-10 scale): Patient reports \"minimal pain\" in the L leg and groin upon arrival.    Any medication changes, allergies to medications, adverse drug reactions, diagnosis change, or new procedure performed?: [x] No    [] Yes (see summary sheet for update)  Subjective functional status/changes:   [x] No changes reported    Patient arrives to the visit accompanied by his wife. He was last seen 2021 and did not return to the clinic until today. He cancelled his last scheduled visit since he was still waiting on delivery of compression garments. He reports that the pain in the L leg and groin area has improved with manual lymphatic drainage which his wife assists him with at home. OBJECTIVE    0 min Therapeutic Exercise:  [] [place] Exercises [x] Remedial Lymphedema Exercise Program - continued education in the role and benefit of the muscle pump function in management of swelling. Continued education in the role and benefit of compression garments with exercise and travel and for at least one hour after participating in high risk activities  [] Axillary Web Exercise Program      [] Shoulder ROM Exercises   Rationale: Activate muscle pump to improve lymphatic fluid movement and decrease swelling to improve the patients ability to perform ADL and IADL skills and prevent worsening of swelling over time.     60 min Manual Therapy    Manual Lymphatic Drainage (MLD):  Area to decongest: L LE and trunk   Sequence used and effectiveness: MLD deferred today due to trial of the vaso-pneumatic device. Patient and wife have been educated in the self MLD packet and wife assists with MLD daily at home prior to bed. Continued education in performing the truncal techniques and deep abdominal breathing techniques prior to using the basic pump. Skin/wound care/debridement: Reviewed skin care principles:   Self MLD with bathing and skin care. Continued education in signs and symptoms of infection. Upper/Lower Extremity Compression: Donated patient one pair of Jobst Relief knee highs 20-30 mmHg in the size large during a previous visit. Patient wears the knee highs at least a few hours most days. Fitted patient today with:    Style: Circular knit    Brand: [employer] basic thigh highs 20-30 mmHg (ordered [employer] Soft thigh highs but not covered by insurance)    Type: Non-Custom: Size: 3 regular length    Brand: One pair of Sigvaris Compreshorts     Type: Non-custom: Size X large     Vendor:  [email or web address]    The initial fit and comfort of the compression products is good. Patient will wash and wear the products daily as tolerated for the next week and call the clinic with any issues. Discussed the exchange/return policy with patient and wife today. Education: Educated patient in compression garment donning and doffing. Glove use with donning. Daily wear schedule. Daily laundering. Garment lifespan. Educated patient to monitor for redness or pressure points on the skin. Patient/family demonstrated donning and doffing with independence     Treatment Time: 30 minutes     Vaso-pneumatic Device:  Patient had a trial of the [employer] basic vaso-pneumatic pump during the visit today performed by the rep,Raymundo Crooks, from [facility]. Patient was positioned in supine with the L LE elevated on one pillow. Patient tolerated a 30 minute trial with the compression setting at 30-40 mmHg without any issues.  Pre and post measurements of the L LE were taken:    Metatarsals pre pump: 22.5 cm and post pump 22.3 cm  Ankle pre pump: 25.0 cm, post pump 24.5 cm  Calf pre pump: 40.2 cm, post pump: 39.5 cm  Knee pre pump: 39.9 cm, post pump 39.7 cm  Thigh pre pump: 56.4 cm, post pump 56.8 cm    Patient had a decrease in 4 out of 5 measurements. The garment to the vaso-pneumatic was slightly too short which most likely contributed to the increase in girth measurement of the upper leg. The rep was able to send the pump home with the patient today and will have the pump garment in the longer length shipped to his house within the next 24 to 48 hours. Rationale: decrease pain and decrease edema  to improve the patients ability to perform mobility and functional activities with less pain and difficulty. With   [] TE   [] TA   [x] MT   [] SC   [x] other:  Patient Education: [x] Review HEP    [x] MLD Patient Education Reviewed with patient and wife. Continued education in self MLD technique with bathing and skin care. Provided patient with the written instructions to follow. [x] Progressed/Changed HEP based on: exercise progression and compression garments with exercise. [x] positioning   [] Kinesiotape   [x] Skin care   [] wound care   [x] other: fitting of garments, pump trial and precautions   Patient / caregiver re-demonstrated bandaging. [] Yes  [] No  Compression bandaging/garment precautions reviewed: [x] Yes  [] No     Other Objective/Functional Measures: LE Volumes     L LE volumes taken today 8,330.89 ml compared to 8,730.26 ml on 6/1/21 and compared to 8,500.56 ml on evaluation 3/10/21. R LE volumes taken today 8,251.98 ml compared to 8,435.48 ml on 6/1/21 and compared to 8,391.24 ml on evaluation 3/10/21. Percent difference today is <1% L LE > R LE as compared to 1.30% on evaluation. Pain Level (0-10 scale) post treatment: 0/10    ASSESSMENT:  Patient has done well with his home program since his last visit.  He reports that pain and swelling in the L leg and groin area have been relatively well controlled with exercise, compression garments, and manual lymphatic drainage. Full LE volumetric measurements taken today reveal a decrease in volumes in the legs bilaterally since evaluation. Sensitivity and pain in the legs and groin were rated at a 3/10 on evaluation with complaints that it was unbearable at times. Patient is able to rate pain today at \"minimal\" to 0/10 and reports taking less pain medication. Patient tolerated a pump trial during the visit today with good results and his new compression products fit well. Patient has the tools required to be successful in management of swelling and pain in the L LE. He has been seen for a total of 4 visits and has met all goals set on evaluation. He is ready to be discharged to the restorative phase of care and will follow up in 6 months or sooner as needed for assessment of swelling and garment needs. Progress towards goals / Updated goals:  Short term goals  Time frame: to be met by 4/14/2021, all goals will be extended to be met by 8/27/21  1. Patient will demonstrate knowledge of signs/symptoms of infections/cellulitis and be independent in skin care to prevent cellulitis. Goal met 7/28/21  2. Patient will demonstrate independence in lymphedema home program of therapeutic exercises and deep abdominal breathing to improve circulation and decongest limb to improve ADLs. Goal met 7/28/21  3. Patient will participate in the selection process to allow ordering of home compression system (daytime, nighttime garments and pump as needed). Goal met 7/28/21     Long term goals  Time frame: 6/5/2021, all goals will be extended to be met by 8/27/21. 1.  Pt will show stable limb volumes showing pt. ready for transition to independent restorative phase of lymphedema therapy. LE volumes increased bilaterally. Goal met 7/28/21  2.   Patient and/or wife will demonstrate independence with manual lymphatic drainage techniques for management of swelling and pain relief. Goal met 7/28/21    PLAN  []  Upgrade activities as tolerated     []  Extend plan of care  []  Update interventions per flow sheet       [x]  Discharge to the restorative phase of care.    []  Other:_      Ruben Ruiz PT, CLT 7/28/2021

## 2021-11-05 RX ORDER — BACLOFEN 20 MG/1
TABLET ORAL
Qty: 90 TABLET | Refills: 5 | Status: SHIPPED | OUTPATIENT
Start: 2021-11-05 | End: 2022-05-05

## 2022-01-19 ENCOUNTER — HOSPITAL ENCOUNTER (OUTPATIENT)
Dept: PHYSICAL THERAPY | Age: 47
Discharge: HOME | End: 2022-01-19
Payer: MEDICARE

## 2022-01-19 VITALS — BODY MASS INDEX: 30.06 KG/M2 | HEIGHT: 70 IN | WEIGHT: 210 LBS

## 2022-01-19 PROCEDURE — 97161 PT EVAL LOW COMPLEX 20 MIN: CPT

## 2022-01-19 PROCEDURE — 97140 MANUAL THERAPY 1/> REGIONS: CPT

## 2022-01-19 NOTE — PROGRESS NOTES
[facility]  Lymphedema Clinic  [address] [place] [address], [address], [facility] 22.    INITIAL EVALUATION    NAME: Edie Hurst AGE: 55 y.o. GENDER: male  DATE: 1/19/2022  REFERRING PHYSICIAN: INDERJIT Panda  HISTORY AND BACKGROUND:   Primary Diagnosis:  · Lymphedema, not elsewhere classified (B LE) (I89.0)  Other Treatment Diagnoses:   Abnormality of gait (R26.9)   Swelling not relieved by elevation (R60.9)   Pain in the R testicle (N50.811)   Pain in the L testicle (B35.258)   Pain in the L leg (M79.605)  Date of Onset: 1/1/2020 - patient is returning today for evaluation  Present Symptoms and Functional Limitations: Patient reports that swelling and pain in the legs began soon after having chemotherapy and radiation therapy for an inoperable brain tumor. He was first seen in our clinic May-July 2021 and was fitted with thigh highs with bike shorts and obtained a vaso-pneumatic device at that time. Patient reports that the pain and swelling has been fairly well managed for the past 6 months with daily use of compression products. He is returning to the clinic today for assessment of swelling and to obtain new compression products to last the next 6 months. Patient is scheduled to have surgery on his R shoulder next week. Lymphedema Life Impact Scale: patient scores a 22 with 34% impairment  Past Medical History:   Past Medical History:   Diagnosis Date    Anxiety     Fatigue     Headache     Joint pain     Leg swelling     Memory loss     Musculoskeletal disorder     Neuropathy    No past surgical history on file.   Current Medications:    Current Outpatient Medications   Medication Sig    baclofen (LIORESAL) 20 mg tablet TAKE 1 TABLET BY MOUTH THREE TIMES DAILY    promethazine (PHENERGAN) 12.5 mg tablet TAKE 1 TABLET BY MOUTH EVERY 4 HOURS    valACYclovir (VALTREX) 500 mg tablet TAKE 1 TABLET BY MOUTH TWO TIMES DAILY    ondansetron (ZOFRAN ODT) 4 mg disintegrating tablet Take 1 Tab by mouth every eight (8) hours as needed for Nausea.  carBAMazepine XR (TEGRETOL XR) 200 mg SR tablet Take 1 tab in AM and 1 and 1/2 in PM (Patient taking differently: Take 300 mg by mouth two (2) times a day. Take 1 tab in AM and 1 and 1/2 in PM)    predniSONE (DELTASONE) 20 mg tablet Take 1 Tab by mouth daily.  baclofen (LIORESAL) 10 mg tablet Take 1 Tab by mouth three (3) times daily as needed. (Patient not taking: Reported on 3/14/2019)    ALPRAZolam (XANAX) 0.5 mg tablet TAKE 1 TABLET BY MOUTH TWO TIMES DAILY AS NEEDED FOR ANXIETY    pantoprazole (PROTONIX) 40 mg tablet TAKE 1 TABLET BY MOUTH TWO TIMES DAILY    sucralfate (CARAFATE) 1 gram tablet TAKE 1 TABLET BY MOUTH FOUR TIMES DAILY     No current facility-administered medications for this encounter. Allergies: Allergies   Allergen Reactions    Fioricet [Butalbital-Acetaminophen-Caff] Unknown (comments)    Prozac [Fluoxetine] Unknown (comments)        Prior Level of Function/Social/Work History/Personal factors and/or comorbidities impacting plan of care: Patient is on disability and not currently working. He is not participating in an exercise program.   Living Situation: Patient lives with his wife and two young children in a single story house. Trainable Caregiver?: Yes - wife is supportive  Mobility: Independent gait without any assistive device for community distances  Sleeping Arrangement:  in bed  Adaptive Equipment Owned: None    Previous Therapy: In our clinic March-July 2021  Compression/Lymphedema Equipment: Jobst Relief knee highs size large and Juzo Soft thigh highs size 3 regular length 20-30 mmHg, Sigvaris Compreshort bike shorts size L    SUBJECTIVE:   Patient reports wearing his compression products each day which seems to help the swelling and pain in the leg and groin. The garments are worn and now slip down the leg.      Patient's goals for therapy: obtain new compression products. OBJECTIVE DATA SUMMARY:   EXAMINATION/PRESENTATION/DECISION MAKING:   Pain:   Patient reports intermittent pain in the L leg and testicles. Pain not rated. Self-Care and ADLs:  Independent with bathing and dressing. Wife will assist with manual lymphatic drainage. Skin and Tissue Assessment:  Dermal Status: Intact    Texture/Consistency: Boggy L LE     Pigmentation/Color Change: Normal    Anomalies: N/A    Circulatory: N/A    Nails: Normal    Stemmers Sign: Negative    Height:  Height: 5' 10\" (177.8 cm) (per patient)  Weight:  Weight: 95.3 kg (210 lb)   BMI:  BMI (calculated): 30.1  (36 or greater: adversely affecting lymphedema)  Wound/Ulcer:  N/A        Volumetric Measurements:   Right:   8,011. 17 mL (loss of 241 ml since last assessed 7/28/21)  Left:   8,202. 11 mL (loss of 129 ml since last assessed 7/28/21)   % Difference: 2.38% L LE > R LE  Dominance: R   Range of Motion: within functional limits  Strength: Not formally assessed 2* patient is able to complete all functional activities in the clinic today. Sensation:  Intact    Mobility:    Bed/Chair Mobility: Independent  Transfers: Independent  Gait: Independent  Endurance: Fair - patient is sedentary     Safety:  Patient is alert and oriented: Yes  Safety awareness: Appears intact   Fall risk?: Yes   Patient given written fall prevention handout: Yes     Based on the above components, the patient evaluation is determined to be of the following complexity level: MEDIUM    Evaluation Time: 2:10-2:35 pm    TREATMENT PROVIDED:   1. Treatment description:  Manual Therapy: Patient arrives to the clinic without compression products in place. Patient's skin is inspected and is intact and soft in texture. Patient reports wearing a thigh high on the L LE in conjunction with a pair of SigPacketHopeshort bike shorts each day.  Patient did not bring compression products to the clinic for inspection but reports that the thigh highs are worn and will slip down the leg and the bike shorts are now too large. Measured patient today for one pair of Juzo Soft thigh highs in size 3 regular length 20-30 mmHg and one pair of [employer] Men's Active Massage Boxer Brief 15-20 mmHg. Discussed the return/exchange policy with patient and that the boxer briefs are not able to be returned. The order will be submitted to the vendor, [employer], for processing and patient will return to the clinic for fitting. Patient reports that pain and swelling in the L leg and trunk are fairly well managed with the day time compression products and he has contemplated wearing them at night for comfort and management of swelling. Discussed that wearing the Juzo thigh highs at night is not safe as they may become a tourniquet on the leg if they roll down when sleeping. Discussed that there are night time foam products that are safe when sleeping and he voiced interest in possibly ordering a night time product in a future visit. Patient reports that he is no longer going to the gym and is rather sedentary now that he is scheduled for surgery on the R shoulder. Discussed having assistance from his wife for garment management after surgery to prevent tugging and pulling with the R UE. Patient instructed to avoid sitting for prolonged periods at one time and to participate in a walking program up to 25 minutes each day to facilitate the muscle pump function. Patient uses the Airos vaso-pneumatic device 4 nights each week. He initially noticed an increase in swelling in the L upper leg and groin with pump use which has improved by performing deep abdominal breathing techniques and having wife assist with manual lymphatic drainage of the trunk prior to pump use. Treatment time:  2:35-3:25 pm  Minutes: 50  ASSESSMENT:   Dagoberto Aguirre is a 55 y.o. 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  His condition is complicated by chronic pain and sensitivity in the legs and testicles. Full LE volumetric measurements taken today reveal a decrease in volume of the legs bilaterally as well as a low percentage difference of <3% L LE > R LE. Patient has done well with his home program for the past 6 months and feels that pain and swelling have been managed well with compression products and the pump. Patient is now in need of new compression products for management of swelling during the restorative phase of care and those were ordered today. This care is medically necessary due to the infection risk with lymphedema and to improve functional activities. CDT is necessary to resolve swelling to allow patient to return to wearing normal clothes/footwear and prevent worsening of symptoms, such as infections and/or hospitalizations. Patient will be independent with home program strategies to allow improved ADL ability and mobility and to allow patient to return to greatest functional independence. Rehabilitation potential is considered to be Fair. Factors which may influence rehabilitation potential include pain, inoperable brain tumor, and good family support. Patient will benefit from 2 to 4 physical therapy visits over 12 weeks to optimize improvement in these areas. PLAN OF CARE:   Recommendations and Planned Interventions: Manual lymph drainage/decongestive therapy, Compression garment fitting/provision, Lymphedema therapeutic exercise, Education in skin care and lymphedema precautions, Self-MLD education per home program and Caregiver education as needed    GOALS    Time frame: to be met by 4/15/2022  1. The order for compression products will be submitted to the vendor, [employer] for processing. 2.  The fit and comfort of the compression products will be deemed good prior to ordering a second set of products.   3.  Patient will obtain a night time foam product for better management of swelling and comfort during the restorative phase of care. 4.  Pt will show stable limb volumes showing pt. ready for transition to independent restorative phase of lymphedema therapy. Patient has participated in goal setting and agrees to work toward plan of care. Patient was instructed to call if questions or concerns arise. Thank you for this referral.  Ajay Spaulding, PT, CLT   Time Calculation: 75 mins    TREATMENT PLAN EFFECTIVE DATES:   1/19/2022 TO 4/15/2022  I have read the above plan of care for Juju Womack. I certify the above prescribed services are required by this patient and are medically necessary.   The above plan of care has been developed in conjunction with the lymphedema/physical therapist.       Physician Signature: ____________________________________Date:______________     INDERJIT Fishman

## 2022-01-21 NOTE — PROGRESS NOTES
Statement of Medical Necessity  Page 1 of 2      Pam Rosas 1975 Today's Date: 1/21/2022 OCHOA: [employer]   [place] / Plan: [place] MED[place]ARE PART A & B / Product Type: Medicare /  ME: TBD  Refills: 2                   Diagnosis  []   I97.2 Post-Mastectomy Lymphedema []   I87.2 Venous Insufficiency   [x]   I89.0 Lymphedema, other secondary B LE []   I83.019 Venous Stasis Ulcer LE, Right   []   I89.9 Unspecified Lymphatic Disorder []   I83.029 Venous Stasis Ulcer LE, Left   [x]   R60.9 Swelling not relieved by elevation []   Q82.0 Hereditary/ Congenital Lymphedema   []   C50.211 Malignant neoplasm of breast, Right []   G89.3  Cancer associated pain   []   C50.212 Malignant neoplasm of breast, Left []   L03.115 LE Cellulitis, Right   []    []   L03.116 LE Cellulitis, Left                                     Pam Rosas    1975  Page 2 of 2    Physician Order for DME for Diagnosis of secondary L LE lymphedema with history of chemotherapy and radiation therapy as Listed on Statement of Medical Necessity, Page 1         Recommended Product:  Units Upper Extremity Rt Lt Units Lower Extremity Rt Lt    Circ-Aid, Ready Wrap, Sigvaris Arm    Inelastic binders (Circ-Aid, Farrow)  []Foot   []Below Knee   []Knee   []Thigh      Circ-Aid Ready Wrap, Sigvaris hand    Orlando Robert, night use []Full Leg  []Lower Leg      Tribute Arm, night use   1 Tribute, night use  [x]Full Leg  [x]Lower Leg  X    Orlando Robert Arm, night use    Cuco Sleeve Leg/ Foot, night use  X    Gradiant Compression Sleeves & Gloves  []Custom [] RM Arm:  []CCL1 []CCL2 []CCL3  []Custom [] RM Glove: []CCL1 []CCL2 []CCL3     2 Gradient Compression Stockings   [x]Custom  [x]RM Lower Extremity:   [x]CCL1       [x]CCL2         []CCL3   [x]Knee       [x]Thigh        []Waist Length      Cuco sleeve arm w/ hand, night use   2 Tribute Wrap, night use  X    Compression Bra          Compression Vest         The above patient was referred for treatment of Lymphedema due to the diagnosis indicated above. Lymphedema is a progressive and chronic condition. It may cause acute infections, muscle atrophy, discomfort, and connective tissue fibrosis with irreversible tissue damage, decreased ROM in the affected limb and diminished functional mobility possibly interfering with independence and ability to work. Recurrent infections and wound complications that commonly occur with Lymphedema often require hospitalization and extensive wound care, thus increasing medical costs. The patient has received complete decongestive therapy which includes manual lymphatic drainage, lymphedema specific exercises, compression bandaging, and hygiene/skin care. Goals of therapy are to reduce the edema and prevent re-accumulation of fluid with its complications. It is medically necessary for this patient to have daytime garments to control this condition. They will need 2 sets (one set to wear and one set to wash for adequate skin care and wearing time). Garments must be replaced every 4-6 months for effectiveness. There are no substitutes available that offer acceptable compression treatment for this Lymphedema patient. If further information is requested, please contact our certified lymphedema therapists at [phone].     [] Rina Caldwell, PT, MSPT, CLT-DAX [] Miller Riley, MS, OTR/L, CLT [x]  Alicia Ellison, PT, CLT [] Nate Luis, PTA, CLT, CSIS    Printed  Provider Name  Provider Signature  Date    Provider NPI

## 2022-05-05 RX ORDER — BACLOFEN 20 MG/1
TABLET ORAL
Qty: 90 TABLET | Refills: 5 | Status: SHIPPED | OUTPATIENT
Start: 2022-05-05

## 2022-10-04 NOTE — PROGRESS NOTES
[facility]  Lymphedema Clinic  [address] [facility], [address], [address] THERAPY DISCHARGE NOTE      10/4/2022:  Patient will be discharged from lymphedema therapy at this time. Criteria for termination of care:    Patient has not returned to therapy and plan of care ended 4/15/2022. If you need any further information, please contact us at [phone].     Thank you for this referral.  Beck Landry, PT, CLT

## 2022-10-25 RX ORDER — BACLOFEN 20 MG/1
TABLET ORAL
Qty: 90 TABLET | Refills: 5 | OUTPATIENT
Start: 2022-10-25

## 2023-08-13 ENCOUNTER — APPOINTMENT (OUTPATIENT)
Facility: HOSPITAL | Age: 48
End: 2023-08-13
Payer: MEDICARE

## 2023-08-13 ENCOUNTER — HOSPITAL ENCOUNTER (EMERGENCY)
Facility: HOSPITAL | Age: 48
Discharge: HOME | End: 2023-08-13
Attending: STUDENT IN AN ORGANIZED HEALTH CARE EDUCATION/TRAINING PROGRAM
Payer: MEDICARE

## 2023-08-13 VITALS
SYSTOLIC BLOOD PRESSURE: 120 MMHG | HEIGHT: 70 IN | OXYGEN SATURATION: 100 % | TEMPERATURE: 98.7 F | DIASTOLIC BLOOD PRESSURE: 76 MMHG | RESPIRATION RATE: 16 BRPM | BODY MASS INDEX: 20.36 KG/M2 | WEIGHT: 142.2 LBS | HEART RATE: 91 BPM

## 2023-08-13 DIAGNOSIS — K59.00 CONSTIPATION, UNSPECIFIED CONSTIPATION TYPE: Primary | ICD-10-CM

## 2023-08-13 DIAGNOSIS — R10.9 ABDOMINAL PAIN, UNSPECIFIED ABDOMINAL LOCATION: ICD-10-CM

## 2023-08-13 LAB
ALBUMIN SERPL-MCNC: 4.3 G/DL (ref 3.5–5)
ALBUMIN/GLOB SERPL: 1.3 (ref 1.1–2.2)
ALP SERPL-CCNC: 73 U/L (ref 45–117)
ALT SERPL-CCNC: 19 U/L (ref 12–78)
ANION GAP SERPL CALC-SCNC: 8 MMOL/L (ref 5–15)
AST SERPL-CCNC: 17 U/L (ref 15–37)
BASOPHILS # BLD: 0 K/UL (ref 0–0.1)
BASOPHILS NFR BLD: 1 % (ref 0–1)
BILIRUB SERPL-MCNC: 0.3 MG/DL (ref 0.2–1)
BUN SERPL-MCNC: 11 MG/DL (ref 6–20)
BUN/CREAT SERPL: 10 (ref 12–20)
CALCIUM SERPL-MCNC: 9.1 MG/DL (ref 8.5–10.1)
CHLORIDE SERPL-SCNC: 100 MMOL/L (ref 97–108)
CO2 SERPL-SCNC: 30 MMOL/L (ref 21–32)
CREAT SERPL-MCNC: 1.12 MG/DL (ref 0.7–1.3)
DIFFERENTIAL METHOD BLD: NORMAL
EOSINOPHIL # BLD: 0 K/UL (ref 0–0.4)
EOSINOPHIL NFR BLD: 1 % (ref 0–7)
ERYTHROCYTE [DISTWIDTH] IN BLOOD BY AUTOMATED COUNT: 11.8 % (ref 11.5–14.5)
GLOBULIN SER CALC-MCNC: 3.4 G/DL (ref 2–4)
GLUCOSE SERPL-MCNC: 115 MG/DL (ref 65–100)
HCT VFR BLD AUTO: 43.9 % (ref 36.6–50.3)
HGB BLD-MCNC: 15 G/DL (ref 12.1–17)
IMM GRANULOCYTES # BLD AUTO: 0 K/UL (ref 0–0.04)
IMM GRANULOCYTES NFR BLD AUTO: 0 % (ref 0–0.5)
LIPASE SERPL-CCNC: 96 U/L (ref 73–393)
LYMPHOCYTES # BLD: 2.1 K/UL (ref 0.8–3.5)
LYMPHOCYTES NFR BLD: 33 % (ref 12–49)
MCH RBC QN AUTO: 30.7 PG (ref 26–34)
MCHC RBC AUTO-ENTMCNC: 34.2 G/DL (ref 30–36.5)
MCV RBC AUTO: 89.8 FL (ref 80–99)
MONOCYTES # BLD: 0.6 K/UL (ref 0–1)
MONOCYTES NFR BLD: 9 % (ref 5–13)
NEUTS SEG # BLD: 3.6 K/UL (ref 1.8–8)
NEUTS SEG NFR BLD: 56 % (ref 32–75)
NRBC # BLD: 0 K/UL (ref 0–0.01)
NRBC BLD-RTO: 0 PER 100 WBC
PLATELET # BLD AUTO: 198 K/UL (ref 150–400)
PMV BLD AUTO: 9 FL (ref 8.9–12.9)
POTASSIUM SERPL-SCNC: 4.1 MMOL/L (ref 3.5–5.1)
PROT SERPL-MCNC: 7.7 G/DL (ref 6.4–8.2)
RBC # BLD AUTO: 4.89 M/UL (ref 4.1–5.7)
SODIUM SERPL-SCNC: 138 MMOL/L (ref 136–145)
WBC # BLD AUTO: 6.4 K/UL (ref 4.1–11.1)

## 2023-08-13 PROCEDURE — 74177 CT ABD & PELVIS W/CONTRAST: CPT

## 2023-08-13 PROCEDURE — 36415 COLL VENOUS BLD VENIPUNCTURE: CPT

## 2023-08-13 PROCEDURE — 99285 EMERGENCY DEPT VISIT HI MDM: CPT

## 2023-08-13 PROCEDURE — 85025 COMPLETE CBC W/AUTO DIFF WBC: CPT

## 2023-08-13 PROCEDURE — 83690 ASSAY OF LIPASE: CPT

## 2023-08-13 PROCEDURE — 6360000004 HC RX CONTRAST MEDICATION: Performed by: EMERGENCY MEDICINE

## 2023-08-13 PROCEDURE — 80053 COMPREHEN METABOLIC PANEL: CPT

## 2023-08-13 RX ORDER — TAMSULOSIN HYDROCHLORIDE 0.4 MG/1
0.4 CAPSULE ORAL DAILY
COMMUNITY

## 2023-08-13 RX ORDER — ENEMA 19; 7 G/133ML; G/133ML
1 ENEMA RECTAL
Qty: 1 EACH | Refills: 0 | Status: SHIPPED | OUTPATIENT
Start: 2023-08-13 | End: 2023-08-13

## 2023-08-13 RX ADMIN — IOPAMIDOL 100 ML: 755 INJECTION, SOLUTION INTRAVENOUS at 19:41

## 2023-08-13 ASSESSMENT — PAIN DESCRIPTION - ORIENTATION: ORIENTATION: MID

## 2023-08-13 ASSESSMENT — PAIN SCALES - GENERAL: PAINLEVEL_OUTOF10: 3

## 2023-08-13 ASSESSMENT — PAIN DESCRIPTION - LOCATION: LOCATION: ABDOMEN

## 2023-08-13 ASSESSMENT — PAIN - FUNCTIONAL ASSESSMENT: PAIN_FUNCTIONAL_ASSESSMENT: 0-10

## 2023-08-13 ASSESSMENT — PAIN DESCRIPTION - DESCRIPTORS: DESCRIPTORS: CRAMPING

## 2023-08-13 NOTE — ED PROVIDER NOTES
[facility] EMERGENCY DEPT  EMERGENCY DEPARTMENT ENCOUNTER      Pt Name: Panfilo Gonzalez  MRN: [id number]  [address] [facility] 1975  Date of evaluation: 8/13/2023  Provider: Lilia Fajardo       Chief Complaint   Patient presents with    Abdominal Pain         HISTORY OF PRESENT ILLNESS   (Location/Symptom, Timing/Onset, Context/Setting, Quality, Duration, Modifying Factors, Severity)  Note limiting factors. This is a 59-year-old male who presents to ED for evaluation of constipation, nausea, gas and belching for about 2 weeks. Patient has a history of constipation and is currently on opioid medications for chronic pain. Patient is having bowel movements although that is runny and he feels that he is still constipated and. Denies history of bowel obstruction. Denies any fevers or chills. Has been taking mag citrate without improvement of symptoms. Review of External Medical Records:     Nursing Notes were reviewed. REVIEW OF SYSTEMS    (2-9 systems for level 4, 10 or more for level 5)     Review of Systems   Constitutional:  Negative for fever. Respiratory:  Negative for shortness of breath. Cardiovascular:  Negative for chest pain. Gastrointestinal:  Positive for abdominal pain, constipation and diarrhea. Except as noted above the remainder of the review of systems was reviewed and negative.        PAST MEDICAL HISTORY     Past Medical History:   Diagnosis Date    Anxiety     Fatigue     Headache     Joint pain     Leg swelling     Memory loss     Musculoskeletal disorder     Neuropathy          SURGICAL HISTORY       Past Surgical History:   Procedure Laterality Date    BRAIN BIOPSY           CURRENT MEDICATIONS       Discharge Medication List as of 8/13/2023  8:21 PM        CONTINUE these medications which have NOT CHANGED    Details   tamsulosin (FLOMAX) 0.4 MG capsule Take 1 capsule by mouth dailyHistorical Med      Meth-Hyo-M Bl-Na Phos-Ph Sal (URIBEL PO) Take by

## 2023-08-13 NOTE — ED NOTES
Pt. Unable to urinate. Patient states he just used restroom. Patient upset about having to wait for CT scan.  Educated patient that imaging sometimes needs lab results before CT due to the possible use of IV contrast.     Burgess Bess RN  08/13/23 2222

## 2023-08-13 NOTE — ED TRIAGE NOTES
GCS 15. Patient ambulatory to treatment area. Patient states that he is having nausea, gas, abdominal cramping and belching since for about 2 weeks.   Patient drank 10 ounces of mag citrate about 24 hours ago

## 2023-08-14 NOTE — DISCHARGE INSTRUCTIONS
Please follow-up with the gastroenterologist listed above. May take the fleets enema as prescribed.   Return as needed

## 2023-08-14 NOTE — ED NOTES
The patient was discharged home by provider in stable condition. The patient is alert and oriented, in no respiratory distress and discharge vital signs obtained. The patient's diagnosis, condition and treatment were explained. The patient expressed understanding and denies any questions or concerns at this time. Patient leaves treatment area ambulatory with all personal belongings.       Srini Atwood RN  08/13/23 203

## 2023-08-18 ASSESSMENT — ENCOUNTER SYMPTOMS
ABDOMINAL PAIN: 1
DIARRHEA: 1
CONSTIPATION: 1
SHORTNESS OF BREATH: 0

## 2024-08-16 ENCOUNTER — HOSPITAL ENCOUNTER (OUTPATIENT)
Facility: HOSPITAL | Age: 49
Setting detail: RECURRING SERIES
Discharge: HOME | End: 2024-08-19
Payer: MEDICARE

## 2024-08-16 PROCEDURE — 97161 PT EVAL LOW COMPLEX 20 MIN: CPT

## 2024-08-16 PROCEDURE — 97535 SELF CARE MNGMENT TRAINING: CPT

## 2024-08-16 NOTE — THERAPY EVALUATION
2022    Volumetric Measurements:   Right:  5,811.62 mL Left:  5,956.49 mL   % Difference: 2.49% Dominance: R hand dominant     25 min [x]Eval - untimed                      Therapeutic Procedures:  Tx Min Billable or 1:1 Min (if diff from Tx Min) Procedure, Rationale, Specifics   25  [id number] Self Care/Home Management (timed):  improve patient knowledge and understanding of home program  to improve patient's ability to progress to PLOF and address remaining functional goals.  (see flow sheet as applicable)    Details if applicable:  Patient's home program was discussed in detail.   Patient uses his home vaso-pneumatic device one to two times per day. He will use the pump on the L LE on a daily basis and uses it on the R LE on occasion. He believes his current pump garment is too large ever since losing approximately 60 lbs. He is interested in obtaining a second pump garment so that he can pump both legs at the same time for better time management.   Patient wears a pair of self purchased knee highs most days. The knee highs are too long and will dig into the crease of the knee. His pain and swelling was better controlled with bike shorts and thigh highs but his current garments are worn and no longer fit well. Donated patient a pair of Nike Pro bike shorts in the size medium today. Patient would benefit from obtaining additional compression products in a future visit so will attempt to secure a vendor and check insurance coverage.   The skin on his legs is intact, except for a few bug bites on the R lower leg anteriorly, and soft in texture. Continued education in daily skin care with a low pH lotion using MLD principles. Discussed signs and symptoms of infection.   Patient reports being active at home. He participates in a daily walking program on a local [email or web address]. He makes an effort to walk 4 times around the track but some days it is a struggle.    25     Total Total     Modalities Rationale:   To  prevent worsening

## 2025-03-28 ENCOUNTER — APPOINTMENT (OUTPATIENT)
Facility: HOSPITAL | Age: 50
End: 2025-03-28
Payer: MEDICARE

## 2025-03-28 ENCOUNTER — HOSPITAL ENCOUNTER (EMERGENCY)
Facility: HOSPITAL | Age: 50
Discharge: HOME | End: 2025-03-31
Payer: MEDICARE

## 2025-03-28 ENCOUNTER — HOSPITAL ENCOUNTER (EMERGENCY)
Facility: HOSPITAL | Age: 50
Discharge: HOME | End: 2025-03-28
Attending: STUDENT IN AN ORGANIZED HEALTH CARE EDUCATION/TRAINING PROGRAM
Payer: MEDICARE

## 2025-03-28 ENCOUNTER — APPOINTMENT (OUTPATIENT)
Dept: VASCULAR SURGERY | Facility: HOSPITAL | Age: 50
End: 2025-03-28
Attending: STUDENT IN AN ORGANIZED HEALTH CARE EDUCATION/TRAINING PROGRAM
Payer: MEDICARE

## 2025-03-28 VITALS
HEART RATE: 66 BPM | SYSTOLIC BLOOD PRESSURE: 114 MMHG | BODY MASS INDEX: 22.19 KG/M2 | TEMPERATURE: 98.3 F | OXYGEN SATURATION: 100 % | DIASTOLIC BLOOD PRESSURE: 78 MMHG | WEIGHT: 155 LBS | HEIGHT: 70 IN | RESPIRATION RATE: 20 BRPM

## 2025-03-28 DIAGNOSIS — M79.604 LEG PAIN, BILATERAL: Primary | ICD-10-CM

## 2025-03-28 DIAGNOSIS — M79.605 LEG PAIN, BILATERAL: Primary | ICD-10-CM

## 2025-03-28 LAB
ALBUMIN SERPL-MCNC: 4 G/DL (ref 3.5–5)
ALBUMIN/GLOB SERPL: 1.1 (ref 1.1–2.2)
ALP SERPL-CCNC: 64 U/L (ref 45–117)
ALT SERPL-CCNC: 29 U/L (ref 12–78)
ANION GAP SERPL CALC-SCNC: 6 MMOL/L (ref 2–12)
APPEARANCE UR: CLEAR
AST SERPL-CCNC: 22 U/L (ref 15–37)
BACTERIA URNS QL MICRO: NEGATIVE /HPF
BASOPHILS # BLD: 0.02 K/UL (ref 0–0.1)
BASOPHILS NFR BLD: 0.5 % (ref 0–1)
BILIRUB SERPL-MCNC: 0.6 MG/DL (ref 0.2–1)
BILIRUB UR QL: NEGATIVE
BUN SERPL-MCNC: 24 MG/DL (ref 6–20)
BUN/CREAT SERPL: 16 (ref 12–20)
CALCIUM SERPL-MCNC: 9.2 MG/DL (ref 8.5–10.1)
CHLORIDE SERPL-SCNC: 102 MMOL/L (ref 97–108)
CO2 SERPL-SCNC: 26 MMOL/L (ref 21–32)
COLOR UR: ABNORMAL
CREAT SERPL-MCNC: 1.51 MG/DL (ref 0.7–1.3)
CRP SERPL-MCNC: 1.42 MG/DL (ref 0–0.3)
DIFFERENTIAL METHOD BLD: ABNORMAL
EOSINOPHIL # BLD: 0 K/UL (ref 0–0.4)
EOSINOPHIL NFR BLD: 0 % (ref 0–7)
EPITH CASTS URNS QL MICRO: ABNORMAL /LPF
ERYTHROCYTE [DISTWIDTH] IN BLOOD BY AUTOMATED COUNT: 12 % (ref 11.5–14.5)
ERYTHROCYTE [SEDIMENTATION RATE] IN BLOOD: 6 MM/HR (ref 0–20)
GLOBULIN SER CALC-MCNC: 3.6 G/DL (ref 2–4)
GLUCOSE SERPL-MCNC: 87 MG/DL (ref 65–100)
GLUCOSE UR STRIP.AUTO-MCNC: NEGATIVE MG/DL
HCT VFR BLD AUTO: 47.3 % (ref 36.6–50.3)
HGB BLD-MCNC: 16.4 G/DL (ref 12.1–17)
HGB UR QL STRIP: NEGATIVE
HYALINE CASTS URNS QL MICRO: ABNORMAL /LPF (ref 0–2)
IMM GRANULOCYTES # BLD AUTO: 0.01 K/UL (ref 0–0.04)
IMM GRANULOCYTES NFR BLD AUTO: 0.2 % (ref 0–0.5)
KETONES UR QL STRIP.AUTO: 40 MG/DL
LEUKOCYTE ESTERASE UR QL STRIP.AUTO: NEGATIVE
LIPASE SERPL-CCNC: 44 U/L (ref 13–75)
LYMPHOCYTES # BLD: 0.59 K/UL (ref 0.8–3.5)
LYMPHOCYTES NFR BLD: 13.4 % (ref 12–49)
MAGNESIUM SERPL-MCNC: 2.4 MG/DL (ref 1.6–2.4)
MCH RBC QN AUTO: 31.1 PG (ref 26–34)
MCHC RBC AUTO-ENTMCNC: 34.7 G/DL (ref 30–36.5)
MCV RBC AUTO: 89.6 FL (ref 80–99)
MONOCYTES # BLD: 1.04 K/UL (ref 0–1)
MONOCYTES NFR BLD: 23.6 % (ref 5–13)
NEUTS SEG # BLD: 2.74 K/UL (ref 1.8–8)
NEUTS SEG NFR BLD: 62.3 % (ref 32–75)
NITRITE UR QL STRIP.AUTO: NEGATIVE
NRBC # BLD: 0 K/UL (ref 0–0.01)
NRBC BLD-RTO: 0 PER 100 WBC
PH UR STRIP: 5 (ref 5–8)
PLATELET # BLD AUTO: 148 K/UL (ref 150–400)
PMV BLD AUTO: 8.4 FL (ref 8.9–12.9)
POTASSIUM SERPL-SCNC: 4.2 MMOL/L (ref 3.5–5.1)
PROT SERPL-MCNC: 7.6 G/DL (ref 6.4–8.2)
PROT UR STRIP-MCNC: 30 MG/DL
RBC # BLD AUTO: 5.28 M/UL (ref 4.1–5.7)
RBC #/AREA URNS HPF: ABNORMAL /HPF (ref 0–5)
RBC MORPH BLD: ABNORMAL
SODIUM SERPL-SCNC: 134 MMOL/L (ref 136–145)
SP GR UR REFRACTOMETRY: 1.02 (ref 1–1.03)
URINE CULTURE IF INDICATED: ABNORMAL
UROBILINOGEN UR QL STRIP.AUTO: 0.2 EU/DL (ref 0.2–1)
WBC # BLD AUTO: 4.4 K/UL (ref 4.1–11.1)
WBC URNS QL MICRO: ABNORMAL /HPF (ref 0–4)

## 2025-03-28 PROCEDURE — 72148 MRI LUMBAR SPINE W/O DYE: CPT

## 2025-03-28 PROCEDURE — 36415 COLL VENOUS BLD VENIPUNCTURE: CPT

## 2025-03-28 PROCEDURE — 81001 URINALYSIS AUTO W/SCOPE: CPT

## 2025-03-28 PROCEDURE — 85025 COMPLETE CBC W/AUTO DIFF WBC: CPT

## 2025-03-28 PROCEDURE — 6360000004 HC RX CONTRAST MEDICATION: Performed by: STUDENT IN AN ORGANIZED HEALTH CARE EDUCATION/TRAINING PROGRAM

## 2025-03-28 PROCEDURE — 86140 C-REACTIVE PROTEIN: CPT

## 2025-03-28 PROCEDURE — 80053 COMPREHEN METABOLIC PANEL: CPT

## 2025-03-28 PROCEDURE — 85652 RBC SED RATE AUTOMATED: CPT

## 2025-03-28 PROCEDURE — 93970 EXTREMITY STUDY: CPT

## 2025-03-28 PROCEDURE — 6360000002 HC RX W HCPCS: Performed by: STUDENT IN AN ORGANIZED HEALTH CARE EDUCATION/TRAINING PROGRAM

## 2025-03-28 PROCEDURE — 96375 TX/PRO/DX INJ NEW DRUG ADDON: CPT

## 2025-03-28 PROCEDURE — 96374 THER/PROPH/DIAG INJ IV PUSH: CPT

## 2025-03-28 PROCEDURE — 99285 EMERGENCY DEPT VISIT HI MDM: CPT

## 2025-03-28 PROCEDURE — 2580000003 HC RX 258: Performed by: STUDENT IN AN ORGANIZED HEALTH CARE EDUCATION/TRAINING PROGRAM

## 2025-03-28 PROCEDURE — 94761 N-INVAS EAR/PLS OXIMETRY MLT: CPT

## 2025-03-28 PROCEDURE — 74177 CT ABD & PELVIS W/CONTRAST: CPT

## 2025-03-28 PROCEDURE — 76870 US EXAM SCROTUM: CPT

## 2025-03-28 PROCEDURE — 83735 ASSAY OF MAGNESIUM: CPT

## 2025-03-28 PROCEDURE — 83690 ASSAY OF LIPASE: CPT

## 2025-03-28 RX ORDER — HYDROMORPHONE HYDROCHLORIDE 1 MG/ML
1 INJECTION, SOLUTION INTRAMUSCULAR; INTRAVENOUS; SUBCUTANEOUS
Refills: 0 | Status: COMPLETED | OUTPATIENT
Start: 2025-03-28 | End: 2025-03-28

## 2025-03-28 RX ORDER — ONDANSETRON 2 MG/ML
4 INJECTION INTRAMUSCULAR; INTRAVENOUS ONCE
Status: COMPLETED | OUTPATIENT
Start: 2025-03-28 | End: 2025-03-28

## 2025-03-28 RX ORDER — 0.9 % SODIUM CHLORIDE 0.9 %
1000 INTRAVENOUS SOLUTION INTRAVENOUS ONCE
Status: COMPLETED | OUTPATIENT
Start: 2025-03-28 | End: 2025-03-28

## 2025-03-28 RX ORDER — MORPHINE SULFATE 4 MG/ML
4 INJECTION, SOLUTION INTRAMUSCULAR; INTRAVENOUS
Refills: 0 | Status: COMPLETED | OUTPATIENT
Start: 2025-03-28 | End: 2025-03-28

## 2025-03-28 RX ORDER — IOPAMIDOL 755 MG/ML
100 INJECTION, SOLUTION INTRAVASCULAR
Status: COMPLETED | OUTPATIENT
Start: 2025-03-28 | End: 2025-03-28

## 2025-03-28 RX ADMIN — HYDROMORPHONE HYDROCHLORIDE 1 MG: 1 INJECTION, SOLUTION INTRAMUSCULAR; INTRAVENOUS; SUBCUTANEOUS at 21:02

## 2025-03-28 RX ADMIN — MORPHINE SULFATE 4 MG: 4 INJECTION, SOLUTION INTRAMUSCULAR; INTRAVENOUS at 18:13

## 2025-03-28 RX ADMIN — SODIUM CHLORIDE 1000 ML: 0.9 INJECTION, SOLUTION INTRAVENOUS at 18:13

## 2025-03-28 RX ADMIN — ONDANSETRON 4 MG: 2 INJECTION, SOLUTION INTRAMUSCULAR; INTRAVENOUS at 18:14

## 2025-03-28 RX ADMIN — IOPAMIDOL 100 ML: 755 INJECTION, SOLUTION INTRAVENOUS at 17:36

## 2025-03-28 ASSESSMENT — PAIN DESCRIPTION - LOCATION
LOCATION: LEG;PELVIS
LOCATION: LEG;HIP;GROIN

## 2025-03-28 ASSESSMENT — PAIN SCALES - GENERAL: PAINLEVEL_OUTOF10: 5

## 2025-03-28 ASSESSMENT — PAIN DESCRIPTION - DESCRIPTORS: DESCRIPTORS: ACHING

## 2025-03-28 ASSESSMENT — PAIN DESCRIPTION - PAIN TYPE: TYPE: CHRONIC PAIN

## 2025-03-28 ASSESSMENT — PAIN - FUNCTIONAL ASSESSMENT: PAIN_FUNCTIONAL_ASSESSMENT: 0-10

## 2025-03-28 ASSESSMENT — PAIN DESCRIPTION - ORIENTATION
ORIENTATION: RIGHT;LEFT;UPPER
ORIENTATION: UPPER;LEFT;RIGHT

## 2025-03-28 NOTE — ED PROVIDER NOTES
[facility] EMERGENCY DEPARTMENT  EMERGENCY DEPARTMENT ENCOUNTER      Pt Name: Kehinde Glynn  MRN: [id number]  Birthdate 1975  Date of evaluation: 3/28/2025  Provider: Rosas Mendoza DO    CHIEF COMPLAINT       Chief Complaint   Patient presents with    Hip Pain    Groin Pain         HISTORY OF PRESENT ILLNESS   (Location/Symptom, Timing/Onset, Context/Setting, Quality, Duration, Modifying Factors, Severity)  Note limiting factors.   This is a 53-year-old male with a past medical history of anaplastic astrocytoma, osteoarthritis of the hips, lymphedema, chronic testicle pain, family history of labral tears who presents ED for tractable bilateral hip and thigh pain.  This has happened before several times.  He reports that this happens sometimes when he bends over and certain positions.  He denies any numbness of the lower extremities denies any fevers dysuria numbness to his groin.  Denies any falls or trauma.  Patient has a history of following with pain management currently does not have a pain management doctor.  Has been worked up for this many times in the past without answers.            Review of External Medical Records:     Nursing Notes were reviewed.    REVIEW OF SYSTEMS    (2-9 systems for level 4, 10 or more for level 5)     Review of Systems   Constitutional:  Negative for fever.   Respiratory:  Negative for shortness of breath.    Cardiovascular:  Negative for chest pain.   Gastrointestinal:  Negative for abdominal pain.   Genitourinary:  Positive for testicular pain. Negative for dysuria.   Musculoskeletal:  Positive for back pain.   Neurological:  Negative for numbness.       Except as noted above the remainder of the review of systems was reviewed and negative.       PAST MEDICAL HISTORY     Past Medical History:   Diagnosis Date    Anxiety     Fatigue     Headache     Joint pain     Leg swelling     Memory loss     Musculoskeletal disorder     Neuropathy           SURGICAL HISTORY       Past Surgical History:   Procedure Laterality Date    BRAIN BIOPSY           CURRENT MEDICATIONS       Discharge Medication List as of 3/28/2025 10:55 PM        CONTINUE these medications which have NOT CHANGED    Details   tamsulosin (FLOMAX) 0.4 MG capsule Take 1 capsule by mouth dailyHistorical Med      Meth-Hyo-M Bl-Na Phos-Ph Sal (URIBEL PO) Take by mouthHistorical Med      ALPRAZolam (XANAX) 0.5 MG tablet TAKE 1 TABLET BY MOUTH TWO TIMES DAILY AS NEEDED FOR ANXIETYHistorical Med      !! baclofen (LIORESAL) 10 MG tablet Take 1 tablet by mouth 3 times daily as neededHistorical Med      !! baclofen (LIORESAL) 20 MG tablet Take 1 tablet by mouth 3 times dailyHistorical Med      carBAMazepine (TEGRETOL XR) 200 MG extended release tablet Take 1 tab in AM and 1 and 1/2 in PMHistorical Med      ondansetron (ZOFRAN-ODT) 4 MG disintegrating tablet Take 4 mg by mouth every 8 hours as neededHistorical Med      pantoprazole (PROTONIX) 40 MG tablet Take 1 tablet by mouth 2 times dailyHistorical Med      promethazine (PHENERGAN) 12.5 MG tablet TAKE 1 TABLET BY MOUTH EVERY 4 HOURSHistorical Med      sucralfate (CARAFATE) 1 GM tablet Take 1 tablet by mouth 4 times dailyHistorical Med      valACYclovir (VALTREX) 500 MG tablet Take 1 tablet by mouth 2 times dailyHistorical Med       !! - Potential duplicate medications found. Please discuss with provider.          ALLERGIES     Fluoxetine, Prednisolone, and Nsaids    FAMILY HISTORY     History reviewed. No pertinent family history.       SOCIAL HISTORY       Social History     Socioeconomic History    Marital status:      Spouse name: None    Number of children: None    Years of education: None    Highest education level: None   Tobacco Use    Smoking status: Former     Current packs/day: 0.00     Types: Cigarettes     Quit date: 2013     Years since quittin.5    Smokeless tobacco: Never   Substance and Sexual Activity    Alcohol

## 2025-03-28 NOTE — ED TRIAGE NOTES
Patient arrives ambulatory to triage with the c.c. of bilateral hip that radiates to groin area, pt reports has been dealing with for years; pt denies any urinary symptoms.

## 2025-03-29 ASSESSMENT — ENCOUNTER SYMPTOMS
ABDOMINAL PAIN: 0
BACK PAIN: 1
SHORTNESS OF BREATH: 0

## 2025-03-29 NOTE — ED NOTES
I have reviewed discharge instructions with the patient and spouse.  Opportunity for questions and clarification was provided.  The patient and spouse verbalized understanding.  Patient discharged out of the ED via walking with mild difficulty and in stable condition.    
Patient to MRI  
normal...

## 2025-03-30 LAB — ECHO BSA: 1.86 M2
